# Patient Record
Sex: FEMALE | Race: WHITE | NOT HISPANIC OR LATINO | Employment: FULL TIME | URBAN - METROPOLITAN AREA
[De-identification: names, ages, dates, MRNs, and addresses within clinical notes are randomized per-mention and may not be internally consistent; named-entity substitution may affect disease eponyms.]

---

## 2017-04-10 DIAGNOSIS — R73.01 IMPAIRED FASTING GLUCOSE: ICD-10-CM

## 2017-04-12 LAB
ALBUMIN SERPL-MCNC: 4.2 G/DL (ref 3.5–5.5)
ALBUMIN/GLOB SERPL: 1.8 {RATIO} (ref 1.2–2.2)
ALP SERPL-CCNC: 67 IU/L (ref 39–117)
ALT SERPL-CCNC: 20 IU/L (ref 0–32)
AST SERPL-CCNC: 16 IU/L (ref 0–40)
BILIRUB SERPL-MCNC: 0.4 MG/DL (ref 0–1.2)
BUN SERPL-MCNC: 21 MG/DL (ref 6–24)
BUN/CREAT SERPL: 28 (ref 9–23)
CALCIUM SERPL-MCNC: 10 MG/DL (ref 8.7–10.2)
CHLORIDE SERPL-SCNC: 106 MMOL/L (ref 96–106)
CO2 SERPL-SCNC: 24 MMOL/L (ref 18–29)
CREAT SERPL-MCNC: 0.74 MG/DL (ref 0.57–1)
GLOBULIN SER CALC-MCNC: 2.3 G/DL (ref 1.5–4.5)
GLUCOSE SERPL-MCNC: 106 MG/DL (ref 65–99)
HBA1C MFR BLD: 5.5 % (ref 4.8–5.6)
POTASSIUM SERPL-SCNC: 4.4 MMOL/L (ref 3.5–5.2)
PROT SERPL-MCNC: 6.5 G/DL (ref 6–8.5)
SODIUM SERPL-SCNC: 146 MMOL/L (ref 134–144)

## 2017-04-14 ENCOUNTER — OFFICE VISIT (OUTPATIENT)
Dept: FAMILY MEDICINE CLINIC | Facility: CLINIC | Age: 52
End: 2017-04-14

## 2017-04-14 VITALS
SYSTOLIC BLOOD PRESSURE: 120 MMHG | BODY MASS INDEX: 24.4 KG/M2 | WEIGHT: 161 LBS | RESPIRATION RATE: 16 BRPM | OXYGEN SATURATION: 97 % | HEART RATE: 73 BPM | HEIGHT: 68 IN | DIASTOLIC BLOOD PRESSURE: 80 MMHG | TEMPERATURE: 97.5 F

## 2017-04-14 DIAGNOSIS — R73.01 IMPAIRED FASTING GLUCOSE: Primary | ICD-10-CM

## 2017-04-14 DIAGNOSIS — E78.2 MIXED HYPERLIPIDEMIA: ICD-10-CM

## 2017-04-14 DIAGNOSIS — Z00.00 LABORATORY EXAMINATION ORDERED AS PART OF A ROUTINE GENERAL MEDICAL EXAMINATION: ICD-10-CM

## 2017-04-14 PROCEDURE — 99213 OFFICE O/P EST LOW 20 MIN: CPT | Performed by: PHYSICIAN ASSISTANT

## 2017-04-14 NOTE — PATIENT INSTRUCTIONS
Low glycemic index diet  Exercise 30 minutes most days of the week  Make sure you get results on any labs or tests we ordered today  We discussed medications and how to take them as prescribed  Sleep 6-8 hours each night if possible  If you have not signed up for [employer], please activate your code ASAP from your After Visit Summary today    LDL goal <100  LDL goal if heart disease <70  HDL goal >60  Triglyceride goal <150  BP goal =<130/80  Fasting glucose <100

## 2017-04-14 NOTE — PROGRESS NOTES
Subjective   Eula Bajwa is a 51 y.o. female.     History of Present Illness   Eula Bajwa 51 y.o. female who presents today for routine follow up check and medication refills.  she has a history of There is no problem list on file for this patient.  .  Since the last visit, she has overall felt well.  She has hyperglycemia and stable..  she has been compliant with current medications have reviewed them.  The patient denies medication side effects.  2013 AHA (American Heart Association) Cholesterol Risk Ratio Score Goal is <=7.5% and your score is:  1.2%---OCT labs  Lab Results   Component Value Date    HGBA1C 5.5 04/10/2017     I did this d/t impaired fasting glucose;  a1C was normal    She is not taking REquip and doing fine;     Info for colonoscopy given and cologuard    I will update labs in Oct    Mom had borderline DMII    She does exercise and is working on diet;  Weight is down 13lbs at home  I am watching her lipids also  She sees GYN  The following portions of the patient's history were reviewed and updated as appropriate: allergies, current medications, past family history, past medical history, past social history, past surgical history and problem list.    Review of Systems   Constitutional: Negative for activity change, appetite change and unexpected weight change.   HENT: Negative for nosebleeds and trouble swallowing.    Eyes: Negative for pain and visual disturbance.   Respiratory: Negative for chest tightness, shortness of breath and wheezing.    Cardiovascular: Negative for chest pain and palpitations.   Gastrointestinal: Negative for abdominal pain and blood in stool.   Endocrine: Negative.    Genitourinary: Negative for difficulty urinating and hematuria.   Musculoskeletal: Negative for joint swelling.   Skin: Negative for color change and rash.   Allergic/Immunologic: Negative.    Neurological: Negative for syncope and speech difficulty.   Hematological: Negative for adenopathy.    Psychiatric/Behavioral: Negative for agitation and confusion.   All other systems reviewed and are negative.      Objective   Physical Exam   Constitutional: She is oriented to person, place, and time. She appears well-developed and well-nourished. No distress.   HENT:   Head: Normocephalic and atraumatic.   Eyes: Conjunctivae and EOM are normal. Pupils are equal, round, and reactive to light. Right eye exhibits no discharge. Left eye exhibits no discharge. No scleral icterus.   Neck: Normal range of motion. Neck supple. No tracheal deviation present. No thyromegaly present.   Cardiovascular: Normal rate, regular rhythm, normal heart sounds, intact distal pulses and normal pulses.  Exam reveals no gallop.    No murmur heard.  Pulmonary/Chest: Effort normal and breath sounds normal. No respiratory distress. She has no wheezes. She has no rales.   Musculoskeletal: Normal range of motion.   Neurological: She is alert and oriented to person, place, and time. She exhibits normal muscle tone. Coordination normal.   Skin: Skin is warm. No rash noted. No erythema. No pallor.   Psychiatric: She has a normal mood and affect. Her behavior is normal. Judgment and thought content normal.   Nursing note and vitals reviewed.      Assessment/Plan   Problems Addressed this Visit     None      Visit Diagnoses     Impaired fasting glucose    -  Primary    Mixed hyperlipidemia        Laboratory examination ordered as part of a routine general medical examination        Relevant Orders    Comprehensive metabolic panel    Lipid panel    CBC and Differential    TSH    Hemoglobin A1c    T4, Free    Vitamin D 25 Hydroxy

## 2017-07-25 ENCOUNTER — TRANSCRIBE ORDERS (OUTPATIENT)
Dept: OCCUPATIONAL THERAPY | Facility: CLINIC | Age: 52
End: 2017-07-25

## 2017-07-25 DIAGNOSIS — S46.911A RIGHT SHOULDER STRAIN, INITIAL ENCOUNTER: Primary | ICD-10-CM

## 2017-07-27 ENCOUNTER — TREATMENT (OUTPATIENT)
Dept: PHYSICAL THERAPY | Facility: CLINIC | Age: 52
End: 2017-07-27

## 2017-07-27 DIAGNOSIS — S46.911D SHOULDER STRAIN, RIGHT, SUBSEQUENT ENCOUNTER: Primary | ICD-10-CM

## 2017-07-27 PROCEDURE — 97140 MANUAL THERAPY 1/> REGIONS: CPT | Performed by: PHYSICAL THERAPIST

## 2017-07-27 PROCEDURE — 97033 APP MDLTY 1+IONTPHRSIS EA 15: CPT | Performed by: PHYSICAL THERAPIST

## 2017-07-27 PROCEDURE — 97001 PR PHYS THERAPY EVALUATION: CPT | Performed by: PHYSICAL THERAPIST

## 2017-07-27 PROCEDURE — 97035 APP MDLTY 1+ULTRASOUND EA 15: CPT | Performed by: PHYSICAL THERAPIST

## 2017-07-27 PROCEDURE — 97110 THERAPEUTIC EXERCISES: CPT | Performed by: PHYSICAL THERAPIST

## 2017-07-27 NOTE — PROGRESS NOTES
Physical Therapy Initial Evaluation and Plan of Care    Patient: Eula Bajwa   : 1965  Diagnosis/ICD-10 Code:  Shoulder strain, right, subsequent encounter [S46.911D]  Referring practitioner: Jose Light MD    Subjective Evaluation    History of Present Illness  Date of onset: 2017  Mechanism of injury: Repetitive closing of truck of car lid caused pinching type pain in the posterior shoulder - to BW in May - pred pack and ibu, HEP    Never fully resolved, now having some decrease in motion    Pred pack, multi V, iron pill  Previous C45 disc herniation, 3 epidurals - full resolution  Working around the house, staining the deck      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   Precautions and Work Restrictions: no lifting >10#, no overhead activityQuality of life: good    Pain  Current pain ratin  At best pain ratin  At worst pain ratin  Location: posterior/lateral shoulder, occasional sense of instability, occasional gets stuck feeling, tightness in UT, occasional tingling into the lateral  Quality: dull ache, sharp, radiating and tight  Relieving factors: medications and relaxation (stretches)  Aggravating factors: overhead activity, lifting and outstretched reach (arm behind back)  Progression: no change    Hand dominance: right             Objective     Postural Observations  Standing posture: fair    Additional Postural Observation Details  Slight protraction of the shoulders    Palpation     Right Tenderness of the infraspinatus and upper trapezius.     Tenderness     Right Shoulder  Tenderness in the infraspinatus tendon.     Additional Tenderness Details  Distal latissimus    Cervical/Thoracic Screen   Cervical range of motion within normal limits  Cervical range of motion within normal limits with the following exceptions: Slight restriction in left lateral flexion and left cervical rotatioin    Neurological Testing   Sensation     Shoulder     Right Shoulder   Intact: light  touch    Active Range of Motion     Right Shoulder   Flexion: 150 degrees   Abduction: 140 degrees   External rotation 90°: 85 degrees  Internal rotation 90°: 70 degrees     Passive Range of Motion     Right Shoulder   Flexion: 155 degrees   Abduction: 155 degrees   External rotation 90°: 90 degrees   Internal rotation 90°: 75 degrees     Scapular Mobility   Left Shoulder   Scapular Mobility with Shoulder to 90° FF   Scapular winging: moderate    Right Shoulder   Scapular mobility: WFL  Scapular Mobility with Shoulder to 90° FF   Scapular winging: minimal    Strength/Myotome Testing     Right Shoulder     Planes of Motion   Flexion: 4   Extension: 4+   Abduction: 4   External rotation at 0°: 4   External rotation at 45°: 4+     Tests     Right Shoulder   Positive active compression ([place]), crank, Hawkin's and Neer's.   Negative empty can.          Assessment & Plan     Assessment  Impairments: abnormal muscle firing, abnormal muscle tone, abnormal or restricted ROM, activity intolerance, impaired physical strength, lacks appropriate home exercise program and pain with function  Assessment details: 52 y.o. Female with right shoulder pain of 3 month duration presents with: 1. Constant posterior right shoulder pain, 2. Slightly decreased shoulder AROM, 3. Tenderness to deep palpation latissimus and posterior joint line, 4. Positive tests for probable labral involvement, 5. Sense of instability in the shoulder, 6. 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  Patient will have functional pain free shoulder AROM  Patient will be able to  perform simulated patient transfers without increased symptoms  Patient will return to work with min/no restrictions      Plan  Therapy options: will be seen for skilled physical therapy services  Planned modality interventions: ultrasound and iontophoresis  Planned therapy interventions: manual therapy, soft tissue mobilization, joint mobilization, home exercise program, strengthening and stretching  Frequency: 3x week  Duration in visits: 12  Duration in weeks: 4  Treatment plan discussed with: patient  Plan details: Discussed probable causes of symptoms and rehab process with patient  Functional Limitations: carrying objects, uncomfortable because of pain, reaching behind back and reaching overhead      Manual Therapy:    14     mins  82921;  Therapeutic Exercise:    10     mins  75326;     Neuromuscular James:    0    mins  01516;    Therapeutic Activity:     0     mins  42109;       Evaluation Time:     20  mins  Timed Treatment:   57   mins   Total Treatment:     90   mins    PT SIGNATURE: Cecelia Staley, PT   DATE TREATMENT INITIATED: 7/27/2017    Initial Certification  Certification Period: 10/25/2017  I certify that the therapy services are furnished while this patient is under my care.  The services outlined above are required by this patient, and will be reviewed every 90 days.     PHYSICIAN: Jose Light MD      DATE:     Please sign and return via fax to [phone].. Thank you, [facility].

## 2017-08-01 ENCOUNTER — TREATMENT (OUTPATIENT)
Dept: PHYSICAL THERAPY | Facility: CLINIC | Age: 52
End: 2017-08-01

## 2017-08-01 DIAGNOSIS — S46.911D SHOULDER STRAIN, RIGHT, SUBSEQUENT ENCOUNTER: Primary | ICD-10-CM

## 2017-08-01 PROCEDURE — 97035 APP MDLTY 1+ULTRASOUND EA 15: CPT | Performed by: PHYSICAL THERAPIST

## 2017-08-01 PROCEDURE — 97110 THERAPEUTIC EXERCISES: CPT | Performed by: PHYSICAL THERAPIST

## 2017-08-01 PROCEDURE — 97033 APP MDLTY 1+IONTPHRSIS EA 15: CPT | Performed by: PHYSICAL THERAPIST

## 2017-08-01 PROCEDURE — 97140 MANUAL THERAPY 1/> REGIONS: CPT | Performed by: PHYSICAL THERAPIST

## 2017-08-01 NOTE — PROGRESS NOTES
Physical Therapy Daily Progress Note    VISIT#: 2    Subjective   Eula Bajwa reports: that her shoulder is sore from the exercises but notes that it is muscle soreness not sharp pain. Having some popping and catching in the shoulder but states that      Objective   Positive Impingement with reaching across body    See Exercise, Manual, and Modality Logs for complete treatment.     Patient Education: Proper exercise technique    Assessment/Plan  Soreness from exercise but no new added true pain.    Progress per Plan of Care  To MD after next session         Manual Therapy:    16     mins  95045;  Therapeutic Exercise:    20     mins  75782;     Neuromuscular James:    0    mins  79501;    Therapeutic Activity:     0     mins  97127;       Timed Treatment:   59   mins   Total Treatment:     70   mins    Cecelia Staley, PT  [place] License # [id number]  Physical Therapist

## 2017-08-02 ENCOUNTER — TREATMENT (OUTPATIENT)
Dept: PHYSICAL THERAPY | Facility: CLINIC | Age: 52
End: 2017-08-02

## 2017-08-02 DIAGNOSIS — S46.911D SHOULDER STRAIN, RIGHT, SUBSEQUENT ENCOUNTER: Primary | ICD-10-CM

## 2017-08-02 PROCEDURE — 97110 THERAPEUTIC EXERCISES: CPT | Performed by: PHYSICAL THERAPIST

## 2017-08-02 PROCEDURE — 97530 THERAPEUTIC ACTIVITIES: CPT | Performed by: PHYSICAL THERAPIST

## 2017-08-02 PROCEDURE — 97140 MANUAL THERAPY 1/> REGIONS: CPT | Performed by: PHYSICAL THERAPIST

## 2017-08-02 NOTE — PROGRESS NOTES
MD Letter    Date of Initial Visit: 7/27/17  Today's Date: 8/2/2017  Patient seen for 3 sessions    Treatment has included: therapeutic exercise, manual therapy, ultrasound and iontophoresis    Subjective   States that she now has more fatigue in the shoulder from the exercises, but has less sharp pain in the shoulder.  States that the pain is deep in the posterior aspect of the shoulder.  Does report some popping/shifting in the joint, but not as bad as it had been. She does report improved mobility in the shoulder.    Objective   Shoulder AROM - flexion 166, Abduction 164, External rotation 90,  Internal rotation 85  Strength - flexion 4/5, abduction 4/5, external rotation 4+/5, internal rotation 5/5  Sensation - intact  Palpation - tenderness to deep palpation in the posterior joint  Special tests - positive OBriens test, positive Crank test, minimal pain with empty can test  Activity tolerances - she is able to perform simulated patinet transfers of supine to sit to stand without pain    Assessment/Plan  Patient has demonstrated moderate improvement since the initiation of therapy.  The pain has  Decreased in intensity and frequency, although she now has some muscle soreness from exercise.  The motion has increased .  The activity tolerances have increased.  I feel that the patient would benefit from a further short course of therapy.  If you have any questions concerning the care, please do not hesitate to contact me.        PT Signature: Cecelia Staley, PT        Manual Therapy:    16     mins  84430;  Therapeutic Exercise:    25     mins  46564;     Neuromuscular James:    0    mins  58585;    Therapeutic Activity:     10     mins  84144;   Assessed for MD    Timed Treatment:   51   mins   Total Treatment:     90   mins

## 2017-08-04 ENCOUNTER — TREATMENT (OUTPATIENT)
Dept: PHYSICAL THERAPY | Facility: CLINIC | Age: 52
End: 2017-08-04

## 2017-08-04 DIAGNOSIS — S46.911D SHOULDER STRAIN, RIGHT, SUBSEQUENT ENCOUNTER: Primary | ICD-10-CM

## 2017-08-04 PROCEDURE — 97140 MANUAL THERAPY 1/> REGIONS: CPT | Performed by: PHYSICAL THERAPIST

## 2017-08-04 PROCEDURE — 97033 APP MDLTY 1+IONTPHRSIS EA 15: CPT | Performed by: PHYSICAL THERAPIST

## 2017-08-04 PROCEDURE — 97110 THERAPEUTIC EXERCISES: CPT | Performed by: PHYSICAL THERAPIST

## 2017-08-04 NOTE — PROGRESS NOTES
Physical Therapy Daily Progress Note    VISIT#: 4    Subjective   Eula Bajwa reports: that she feels much better today than she had been feeling.  States that the posterior shoulder pain seems to have resolved, but has some muscle soreness from hte exercise.      Objective   Flexion 165 after stretch    See Exercise, Manual, and Modality Logs for complete treatment.     Patient Education: cont HEP    Assessment/Plan  Doing extremely well.  Has near full motion and no joint pain today.      Progress strengthening /stabilization /functional activity           Manual Therapy:    17     mins  47254;  Therapeutic Exercise:    25     mins  93902;     Neuromuscular James:    0    mins  03661;    Therapeutic Activity:     0     mins  01294;       Timed Treatment:   57   mins   Total Treatment:     65   mins    Cecelia Staley, PT  [place] License # [id number]  Physical Therapist

## 2017-08-07 ENCOUNTER — TREATMENT (OUTPATIENT)
Dept: PHYSICAL THERAPY | Facility: CLINIC | Age: 52
End: 2017-08-07

## 2017-08-07 DIAGNOSIS — S46.911D SHOULDER STRAIN, RIGHT, SUBSEQUENT ENCOUNTER: Primary | ICD-10-CM

## 2017-08-07 PROCEDURE — 97140 MANUAL THERAPY 1/> REGIONS: CPT | Performed by: PHYSICAL THERAPIST

## 2017-08-07 PROCEDURE — 97110 THERAPEUTIC EXERCISES: CPT | Performed by: PHYSICAL THERAPIST

## 2017-08-07 PROCEDURE — 97033 APP MDLTY 1+IONTPHRSIS EA 15: CPT | Performed by: PHYSICAL THERAPIST

## 2017-08-07 NOTE — PROGRESS NOTES
Physical Therapy Daily Progress Note    VISIT#: 5    Subjective   Eula Bajwa reports: that she is feeling much better than she did last week.  States that she has an occasional pressure in the back of the shoulder but notes that the pain has decreased in both intensity and frequency.      Objective    Active flexion to 165 without pain    Discomfort with deep pressure to the posterior joint line area    Negative Empty Can test    See Exercise, Manual, and Modality Logs for complete treatment.     Patient Education: reason for soreness in lats after exercise    Assessment/Plan  Much improved from initial status.  Decreased pain and increased function.     Progress strengthening /stabilization /functional activity  To MD after next session         Manual Therapy:    17     mins  76905;  Therapeutic Exercise:    25     mins  23571;     Neuromuscular James:    0    mins  97322;    Therapeutic Activity:     0     mins  54979;       Timed Treatment:   57   mins   Total Treatment:     75   mins    Cecelia Staley, PT  [place] License # [id number]  Physical Therapist

## 2017-08-08 ENCOUNTER — TREATMENT (OUTPATIENT)
Dept: PHYSICAL THERAPY | Facility: CLINIC | Age: 52
End: 2017-08-08

## 2017-08-08 PROCEDURE — 97033 APP MDLTY 1+IONTPHRSIS EA 15: CPT | Performed by: PHYSICAL THERAPIST

## 2017-08-08 PROCEDURE — 97140 MANUAL THERAPY 1/> REGIONS: CPT | Performed by: PHYSICAL THERAPIST

## 2017-08-08 PROCEDURE — 97110 THERAPEUTIC EXERCISES: CPT | Performed by: PHYSICAL THERAPIST

## 2017-08-08 PROCEDURE — 97530 THERAPEUTIC ACTIVITIES: CPT | Performed by: PHYSICAL THERAPIST

## 2017-08-08 NOTE — PROGRESS NOTES
MD Letter - Discharge Summary    Date of Initial Visit: 7/27/17  Today's Date: 8/8/2017  Patient seen for 6 sessions    Treatment has included: therapeutic exercise, neuromuscular re-education, manual therapy, ultrasound and iontophoresis    Subjective   States that she feels much better than she did last week.  States that she has a posterior joint ache with occasional movements but no constant pain.  She reports an occasional pop in the posterior joint with overhead activities.      Objective - she presents moving with fluid movement patterns  Shoulder AROM - flexion 166, abduction 158, external rotation 90, internal rotation 85  Strength - flexion 4+/5, abduction 4+/5, extension 5/5, external rotation 4/5, internal rotation 5/5  Sensation - intact  Palpation - tenderness to deep palpation in the posterior inferior joint line, slight tenderness to palpation anterior/lateral subacromial space  Special tests - negative speeds, slightly positive Empty can, positive Load and shift but negative O'Briens test for Labral involvement   Activity tolerances - she is able to perform simulated patinet transfers supine to sit to stand without pain.  She is able to perform the simulated trunk lid closure without pain.      Assessment/Plan  Patient has demonstrated significant improvement since the initiation of therapy.  The pain has  decreased in both frequency and intensity.  The motion has increased to a functional level.  The activity tolerances have increased t work demand level, but she still has occasional symptoms in the shoulder.  I feel that the patient would benefit from continuing her home exercises for further strengthening, but discontinuing her formal therapy. If her symptoms reoccur, I feel that she may benefit from an orthopedic referral.  If you have any questions concerning the care, please do not hesitate to contact me.        PT Signature: Cecelia Staley, PT        Manual Therapy:    17     mins   93171;  Therapeutic Exercise:    15/25     mins  72521;     Neuromuscular James:    0    mins  00827;    Therapeutic Activity:     10     mins  42431;       Timed Treatment:   57   mins   Total Treatment:     90   mins

## 2017-08-14 ENCOUNTER — TRANSCRIBE ORDERS (OUTPATIENT)
Dept: ADMINISTRATIVE | Facility: HOSPITAL | Age: 52
End: 2017-08-14

## 2017-08-14 DIAGNOSIS — M25.511 RIGHT SHOULDER PAIN, UNSPECIFIED CHRONICITY: Primary | ICD-10-CM

## 2017-08-16 ENCOUNTER — TRANSCRIBE ORDERS (OUTPATIENT)
Dept: ADMINISTRATIVE | Facility: HOSPITAL | Age: 52
End: 2017-08-16

## 2017-08-16 DIAGNOSIS — G89.29 CHRONIC RIGHT SHOULDER PAIN: Primary | ICD-10-CM

## 2017-08-16 DIAGNOSIS — M25.511 CHRONIC RIGHT SHOULDER PAIN: Primary | ICD-10-CM

## 2017-08-22 ENCOUNTER — HOSPITAL ENCOUNTER (OUTPATIENT)
Dept: MRI IMAGING | Facility: HOSPITAL | Age: 52
Discharge: HOME | End: 2017-08-22
Attending: ORTHOPAEDIC SURGERY

## 2017-08-22 DIAGNOSIS — M25.511 CHRONIC RIGHT SHOULDER PAIN: ICD-10-CM

## 2017-08-22 DIAGNOSIS — G89.29 CHRONIC RIGHT SHOULDER PAIN: ICD-10-CM

## 2017-08-22 PROCEDURE — 73221 MRI JOINT UPR EXTREM W/O DYE: CPT

## 2017-08-29 ENCOUNTER — TRANSCRIBE ORDERS (OUTPATIENT)
Dept: PHYSICAL THERAPY | Facility: CLINIC | Age: 52
End: 2017-08-29

## 2017-08-29 ENCOUNTER — TREATMENT (OUTPATIENT)
Dept: PHYSICAL THERAPY | Facility: CLINIC | Age: 52
End: 2017-08-29

## 2017-08-29 DIAGNOSIS — S46.911D SHOULDER STRAIN, RIGHT, SUBSEQUENT ENCOUNTER: ICD-10-CM

## 2017-08-29 DIAGNOSIS — M19.011 OSTEOARTHRITIS OF RIGHT SHOULDER, UNSPECIFIED OSTEOARTHRITIS TYPE: Primary | ICD-10-CM

## 2017-08-29 DIAGNOSIS — M24.111 LABRAL TEAR OF SHOULDER, DEGENERATIVE, RIGHT: Primary | ICD-10-CM

## 2017-08-29 PROCEDURE — 97002 PR PHYS THERAPY RE-EVALUATION: CPT | Performed by: PHYSICAL THERAPIST

## 2017-08-29 PROCEDURE — 97110 THERAPEUTIC EXERCISES: CPT | Performed by: PHYSICAL THERAPIST

## 2017-08-29 PROCEDURE — 97140 MANUAL THERAPY 1/> REGIONS: CPT | Performed by: PHYSICAL THERAPIST

## 2017-08-29 NOTE — PROGRESS NOTES
Physical Therapy Initial Evaluation and Plan of Care    Patient: Eula Bajwa   : 1965  Diagnosis/ICD-10 Code:  Labral tear of shoulder, degenerative, right [M24.111]  Referring practitioner: Perico Salomon MD    Subjective Evaluation    History of Present Illness    Subjective comment: Say Dr Salomon, MRI indicates degenerative labral tear with loose chondral bodies, steroid injection 17, has continued to do her HEP daily, has started her on MobicPain  Current pain ratin  At best pain ratin  At worst pain ratin  Location: Lateral shoulder, minimal in posterior shoulder where it was since injury, clicking with elevation  Quality: burning and dull ache  Relieving factors: change in position  Aggravating factors: repetitive movement, outstretched reach and overhead activity  Progression: improved    Diagnostic Tests  MRI studies: abnormal (degenerative labral tear, chondral bodies loose)    Treatments  Previous treatment: physical therapy and medication  Current treatment: injection treatment, medication and physical therapy  Patient Goals  Patient goals for therapy: independence with ADLs/IADLs and return to sport/leisure activities       Objective     Observations     Additional Observation Details  Slightly rounded posture    Palpation     Right   No palpable tenderness to the biceps and infraspinatus.   Hypertonic in the upper trapezius. Tenderness of the supraspinatus.     Active Range of Motion     Right Shoulder   Flexion: 156 degrees   Abduction: 140 degrees   External rotation 90°: 90 degrees  Internal rotation 90°: 80 degrees     Scapular Mobility     Right Shoulder   Scapular mobility: WFL    Joint Play     Right Shoulder  Hypomobile in the posterior capsule.     Strength/Myotome Testing     Right Shoulder     Planes of Motion   Flexion: 4   Extension: 5   Abduction: 4   External rotation at 0°: 4   Internal rotation at 0°: 4+     Isolated Muscles   Biceps: 4+   Supraspinatus: 4    Triceps: 4+     Tests     Right Shoulder   Positive active compression ([place]) and Speed's.   Negative empty can.     Additional Tests Details  Moderate Pain and weakness with O'Briens and Speeds tests but no joint motion noted         Assessment & Plan     Assessment  Impairments: abnormal or restricted ROM, activity intolerance, impaired physical strength, lacks appropriate home exercise program and pain with function  Assessment details: 89 y.o. Female with right shoulder pain and labral tear presents with: 1. Intermittent right shoulder pain, 2. Slightly decreased shoulder AROM, 3. Weakness in right shoulder mm, 4. Recent steroid injection, 5. 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  Patient will have functional pain free shoulder AROM  Patient will be able to perform simulated patient transfers without increased symptoms  Patient will return to work with min/no restrictions        Plan  Therapy options: will be seen for skilled physical therapy services  Planned therapy interventions: manual therapy, strengthening, stretching, joint mobilization and home exercise program  Frequency: 3x week  Duration in visits: 12  Duration in weeks: 4  Treatment plan discussed with: patient        Manual Therapy:    17     mins  79143;  Therapeutic Exercise:    25     mins  72014;     Neuromuscular James:    0    mins  41140;    Therapeutic Activity:     0     mins  20889;       Evaluation Time:      20  mins  Timed Treatment:   42   mins   Total Treatment:     80   mins    PT SIGNATURE: Cecelia Staley, PT   DATE TREATMENT INITIATED: 8/29/2017    Initial Certification  Certification Period: 11/27/2017  I certify that the therapy services are furnished while this patient is under my care.  The services outlined above are required by this patient, and will be reviewed every 90 days.     PHYSICIAN:  Perico Salomon _______________________     DATE:_______________________     Please sign and return via fax to [phone].. Thank you, [facility].

## 2017-08-31 ENCOUNTER — TREATMENT (OUTPATIENT)
Dept: PHYSICAL THERAPY | Facility: CLINIC | Age: 52
End: 2017-08-31

## 2017-08-31 DIAGNOSIS — M24.111 LABRAL TEAR OF SHOULDER, DEGENERATIVE, RIGHT: Primary | ICD-10-CM

## 2017-08-31 PROCEDURE — 97140 MANUAL THERAPY 1/> REGIONS: CPT | Performed by: PHYSICAL THERAPIST

## 2017-08-31 PROCEDURE — 97110 THERAPEUTIC EXERCISES: CPT | Performed by: PHYSICAL THERAPIST

## 2017-09-01 ENCOUNTER — OFFICE VISIT (OUTPATIENT)
Dept: FAMILY MEDICINE CLINIC | Facility: CLINIC | Age: 52
End: 2017-09-01

## 2017-09-01 VITALS
SYSTOLIC BLOOD PRESSURE: 119 MMHG | HEIGHT: 68 IN | TEMPERATURE: 98 F | HEART RATE: 81 BPM | RESPIRATION RATE: 18 BRPM | BODY MASS INDEX: 25.23 KG/M2 | WEIGHT: 166.5 LBS | OXYGEN SATURATION: 97 % | DIASTOLIC BLOOD PRESSURE: 79 MMHG

## 2017-09-01 DIAGNOSIS — J06.9 ACUTE URI: Primary | ICD-10-CM

## 2017-09-01 PROCEDURE — 99213 OFFICE O/P EST LOW 20 MIN: CPT | Performed by: NURSE PRACTITIONER

## 2017-09-01 RX ORDER — AMOXICILLIN AND CLAVULANATE POTASSIUM 875; 125 MG/1; MG/1
1 TABLET, FILM COATED ORAL 2 TIMES DAILY
Qty: 20 TABLET | Refills: 0 | Status: SHIPPED | OUTPATIENT
Start: 2017-09-01 | End: 2017-09-11

## 2017-09-01 NOTE — PROGRESS NOTES
Subjective   Eula Bajwa is a 52 y.o. female.     History of Present Illness   Eula Bajwa 52 y.o. female who presents for evaluation of sinus pressure and congestion. Symptoms include ear pressure, congestion, sore throat, post nasal drip and productive cough.  Onset of symptoms was 7 days ago, unchanged since that time. Patient denies shortness of breath, wheezing.   Evaluation to date: none Treatment to date:  OTC oral decongestants, OTC antihistamines and Mucinex.     The following portions of the patient's history were reviewed and updated as appropriate: allergies, current medications, past family history, past medical history, past social history, past surgical history and problem list.    Review of Systems   Constitutional: Negative for chills and fever.   HENT: Positive for congestion, ear pain, postnasal drip, rhinorrhea and sinus pressure.    Respiratory: Positive for cough. Negative for chest tightness.        Objective   Physical Exam   Constitutional: She is oriented to person, place, and time. She appears well-developed and well-nourished.   HENT:   Right Ear: Tympanic membrane, external ear and ear canal normal.   Left Ear: Tympanic membrane, external ear and ear canal normal.   Nose: Right sinus exhibits no maxillary sinus tenderness and no frontal sinus tenderness. Left sinus exhibits no maxillary sinus tenderness and no frontal sinus tenderness.   Mouth/Throat: Uvula is midline and oropharynx is clear and moist.   Cardiovascular: Normal rate and regular rhythm.    Pulmonary/Chest: Effort normal and breath sounds normal.   Neurological: She is alert and oriented to person, place, and time.   Skin: Skin is warm and dry.   Psychiatric: She has a normal mood and affect. Judgment normal.   Nursing note and vitals reviewed.      Assessment/Plan   Eula was seen today for sinusitis.    Diagnoses and all orders for this visit:    Acute URI  -     amoxicillin-clavulanate (AUGMENTIN) 875-125 MG  per tablet; Take 1 tablet by mouth 2 (Two) Times a Day for 10 days.

## 2017-09-05 ENCOUNTER — TREATMENT (OUTPATIENT)
Dept: PHYSICAL THERAPY | Facility: CLINIC | Age: 52
End: 2017-09-05

## 2017-09-05 DIAGNOSIS — M24.111 LABRAL TEAR OF SHOULDER, DEGENERATIVE, RIGHT: Primary | ICD-10-CM

## 2017-09-05 PROCEDURE — 97140 MANUAL THERAPY 1/> REGIONS: CPT | Performed by: PHYSICAL THERAPIST

## 2017-09-05 PROCEDURE — 97110 THERAPEUTIC EXERCISES: CPT | Performed by: PHYSICAL THERAPIST

## 2017-09-05 NOTE — PROGRESS NOTES
Physical Therapy Daily Progress Note    VISIT#: 3    Subjective   Eula Bajwa reports: that her shoulder is feeling much better.  States that she has little pain and decreased frequency of popping in the shoulder.      Objective   Pain at end range flexion and abduction    Symmetrical movement into flexion    See Exercise, Manual, and Modality Logs for complete treatment.     Patient Education:    Assessment/Plan  Improved scapular stabilization noted as she does have winging with wall push ups any longer. Pain has decreased and motion has increased since the initiation of therapy.    Progress strengthening /stabilization /functional activity           Manual Therapy:    18     mins  11228;  Therapeutic Exercise:    25/50     mins  96506;     Neuromuscular James:    0    mins  68225;    Therapeutic Activity:     0     mins  23154;       Timed Treatment:   43   mins   Total Treatment:     70   mins    Cecelia Staley, PT  [place] License # [id number]  Physical Therapist

## 2017-09-08 ENCOUNTER — TREATMENT (OUTPATIENT)
Dept: PHYSICAL THERAPY | Facility: CLINIC | Age: 52
End: 2017-09-08

## 2017-09-08 DIAGNOSIS — M24.111 LABRAL TEAR OF SHOULDER, DEGENERATIVE, RIGHT: Primary | ICD-10-CM

## 2017-09-08 PROCEDURE — 97140 MANUAL THERAPY 1/> REGIONS: CPT | Performed by: PHYSICAL THERAPIST

## 2017-09-08 PROCEDURE — 97110 THERAPEUTIC EXERCISES: CPT | Performed by: PHYSICAL THERAPIST

## 2017-09-08 NOTE — PROGRESS NOTES
Physical Therapy Daily Progress Note    VISIT#: 4    Subjective   Eula Bajwa reports: that  She has had only little pain in the past few days. States that she still has some tightness but that is resolving as well with the stretches.       Objective   Flexion 170* with stretch    Pinch in anterior shoulder noted with physioball walk out today    See Exercise, Manual, and Modality Logs for complete treatment.     Patient Education: purpose of iontopatch    Assessment/Plan  Continuing to make improvements in strength and has had little to no pain while working this week.  Still with slight impingement in anterior shoulder with UE WB activity.    Progress strengthening /stabilization /functional activity           Manual Therapy:    15     mins  40783;  Therapeutic Exercise:    40     mins  94574;     Neuromuscular James:    0    mins  91793;    Therapeutic Activity:     0     mins  75163;       Timed Treatment:   63   mins   Total Treatment:     70   mins    Cecelia Staley, PT  [place] License # [id number]  Physical Therapist

## 2017-09-12 ENCOUNTER — TREATMENT (OUTPATIENT)
Dept: PHYSICAL THERAPY | Facility: CLINIC | Age: 52
End: 2017-09-12

## 2017-09-12 DIAGNOSIS — M24.111 LABRAL TEAR OF SHOULDER, DEGENERATIVE, RIGHT: Primary | ICD-10-CM

## 2017-09-12 PROCEDURE — 97140 MANUAL THERAPY 1/> REGIONS: CPT | Performed by: PHYSICAL THERAPIST

## 2017-09-12 PROCEDURE — 97110 THERAPEUTIC EXERCISES: CPT | Performed by: PHYSICAL THERAPIST

## 2017-09-12 NOTE — PROGRESS NOTES
Physical Therapy Daily Progress Note    VISIT#: 5    Subjective   Eula Bajwa reports: that she is having  A little more popping in the shoulder than she had been having.  Denies pain with the popping.       Objective   Pinch at end range flexion and abduction     Occasional palpable pops/click with elevation    See Exercise, Manual, and Modality Logs for complete treatment.     Patient Education:    Assessment/Plan  Strength has increased in the shoulder and scapular muscles as she is able to increase resistance with many activities.     Anticipate DC next Visit           Manual Therapy:    17     mins  69014;  Therapeutic Exercise:    30/45     mins  91906;     Neuromuscular James:    0    mins  51064;    Therapeutic Activity:     0     mins  33358;       Timed Treatment:   47   mins   Total Treatment:     70   mins    Cecelia Staley, PT  [place] License # [id number]  Physical Therapist

## 2017-09-14 ENCOUNTER — TREATMENT (OUTPATIENT)
Dept: PHYSICAL THERAPY | Facility: CLINIC | Age: 52
End: 2017-09-14

## 2017-09-14 DIAGNOSIS — M24.111 LABRAL TEAR OF SHOULDER, DEGENERATIVE, RIGHT: Primary | ICD-10-CM

## 2017-09-14 PROCEDURE — 97530 THERAPEUTIC ACTIVITIES: CPT | Performed by: PHYSICAL THERAPIST

## 2017-09-14 PROCEDURE — 97140 MANUAL THERAPY 1/> REGIONS: CPT | Performed by: PHYSICAL THERAPIST

## 2017-09-14 PROCEDURE — 97110 THERAPEUTIC EXERCISES: CPT | Performed by: PHYSICAL THERAPIST

## 2017-09-14 NOTE — PROGRESS NOTES
MD Letter - Discharge Summary    Date of Initial Visit: Type: THERAPY  Noted: 8/29/2017  Today's Date: 9/14/2017  Patient seen for 6 sessions    Treatment has included: therapeutic exercise, neuromuscular re-education, manual therapy and therapeutic activity    Subjective   States that her shoulder is feeling much better now than it was prior to the injection and additional therapy.  She still has occasional clicking with abduction, but it is not painful and does not have a sense of instability or shifting with the click.  States that she is now able to perform all of her regular home and work activities without pain.    Objective   Shoulder AROM - WNL - equal to the uninvolved shoulder  Strength - Flexion 5/5, Abduction 5/5,  Extension 5/5,  ER 4+/5, IR 5/5  Sensation - intact  Palpation - minimal tenderness to palpation in the biceps tendon  Special tests - Pian and weakness with Linn's test, but no pop or shift noted, negative Empty Can and speeds tests  Activity tolerances - she is able to perform simulated patient transfers and all normal activities without iincreased symptoms    Assessment/Plan  Patient has demonstrated significant improvement since the initiation of therapy.  The pain has  Essentially resolved.  The motion has returned to normal.  The activity tolerances have increased to work demand level.  I feel that the patient would benefit from continuing her home exercise program but discontinuing her formal therapy.  If you have any questions concerning the care, please do not hesitate to contact me.          PT Signature: Cecelia Staley, PT        Manual Therapy:    16     mins  37245;  Therapeutic Exercise:    35     mins  77167;     Neuromuscular James:    0    mins  93377;    Therapeutic Activity:     10     mins  26850;   Assessed for MD    Timed Treatment:   61   mins   Total Treatment:     70   mins

## 2017-10-02 ENCOUNTER — RESULTS ENCOUNTER (OUTPATIENT)
Dept: FAMILY MEDICINE CLINIC | Facility: CLINIC | Age: 52
End: 2017-10-02

## 2017-10-02 DIAGNOSIS — Z00.00 LABORATORY EXAMINATION ORDERED AS PART OF A ROUTINE GENERAL MEDICAL EXAMINATION: ICD-10-CM

## 2018-10-19 ENCOUNTER — OFFICE VISIT (OUTPATIENT)
Dept: ORTHOPEDIC SURGERY | Facility: CLINIC | Age: 53
End: 2018-10-19

## 2018-10-19 VITALS — HEIGHT: 68 IN | BODY MASS INDEX: 26.8 KG/M2 | TEMPERATURE: 99.3 F | WEIGHT: 176.8 LBS

## 2018-10-19 DIAGNOSIS — G89.29 CHRONIC PAIN OF RIGHT KNEE: Primary | ICD-10-CM

## 2018-10-19 DIAGNOSIS — M25.561 CHRONIC PAIN OF RIGHT KNEE: Primary | ICD-10-CM

## 2018-10-19 DIAGNOSIS — M17.11 PRIMARY OSTEOARTHRITIS OF RIGHT KNEE: ICD-10-CM

## 2018-10-19 PROCEDURE — 99204 OFFICE O/P NEW MOD 45 MIN: CPT | Performed by: ORTHOPAEDIC SURGERY

## 2018-10-19 PROCEDURE — 73562 X-RAY EXAM OF KNEE 3: CPT | Performed by: ORTHOPAEDIC SURGERY

## 2018-10-19 RX ORDER — GUAIFENESIN, PSEUDOEPHEDRINE HYDROCHLORIDE 600; 60 MG/1; MG/1
1 TABLET, EXTENDED RELEASE ORAL AS NEEDED
COMMUNITY

## 2018-10-19 RX ORDER — IBUPROFEN 200 MG
400 TABLET ORAL EVERY 6 HOURS PRN
COMMUNITY
End: 2019-02-08 | Stop reason: HOSPADM

## 2018-10-19 RX ORDER — MELOXICAM 15 MG/1
15 TABLET ORAL DAILY
COMMUNITY
End: 2021-02-17

## 2018-10-19 NOTE — PROGRESS NOTES
Patient: Eula Navarrete  YOB: 1965 53 y.o. female  Medical Record Number: [id number]    Chief Complaints:   Chief Complaint   Patient presents with   • Right Knee - Establish Care, Pain       History of Present Illness:Eula Navarrete is a 53 y.o. female who presents with complaints of right medial knee pain that has been ongoing for years.  She saw Dr. Amezcua about 4 years ago had a right knee scope and medial meniscectomy.  She was told at that time that she had bone-on-bone medial compartment arthritis.  Unfortunately her pain is worsened.  She now has feelings of instability as though the knee was to give out on her.  On uneven ground or stairs she's very apprehensive that she feels the knee could give out in the second.  She's had previous injections and done physical therapy.  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    Allergies: No Known Allergies    Medications:   Current Outpatient Prescriptions   Medication Sig Dispense Refill   • acetaminophen (TYLENOL) 325 MG tablet Take  by mouth.     • Estradiol 0.75 MG/1.25 GM (0.06%) topical gel Place 1 application on the skin.     • ibuprofen (ADVIL,MOTRIN) 200 MG tablet Take 400 mg by mouth As Needed for Mild Pain .     • Iron-Vit C-Vit B12-Folic Acid (IRON 100 PLUS PO) Take  by mouth.     • meloxicam (MOBIC) 15 MG tablet Take 15 mg by mouth Daily.     • MULTIPLE VITAMIN PO Take  by mouth.     • pseudoephedrine (SUDAFED 12 HOUR) 120 MG 12 hr tablet Take 120 mg by mouth Every 12 (Twelve) Hours.     • pseudoephedrine-guaifenesin (MUCINEX D)  MG per 12 hr tablet Take 1 tablet by mouth As Needed for Allergies.       No current facility-administered medications for this visit.          The following portions of the patient's history were reviewed and updated as appropriate: allergies, current medications, past family history, past medical history, past social history, past surgical history and problem list.    Review of Systems:   A 14 point  "review of systems was performed. All systems negative except pertinent positives/negative listed in HPI above    Physical Exam:   Vitals:    10/19/18 1618   Temp: 99.3 °F (37.4 °C)   TempSrc: Temporal Artery    Weight: 80.2 kg (176 lb 12.8 oz)   Height: 172.7 cm (68\")       General: A and O x 3, ASA, NAD    SCLERA:    Normal    DENTITION:   Normal  Knee:  right    ALIGNMENT:     Varus  ,   Patella  tracks  midline out crepitance    GAIT:    Antalgic    SKIN:    No abnormality    RANGE OF MOTION:   0  -  120   DEG    STRENGTH:   4  / 5    LIGAMENTS:    No varus / valgus instability.   Negative  Lachman.    MENISCUS:     Negative   Lucina       DISTAL PULSES:    Paplable    DISTAL SENSATION :   Intact    LYMPHATICS:     No   lymphadenopathy    OTHER:          - Positive   effusion      -Medial Crepitance with ROM          Radiology:  Xrays 3views right knee (ap,lateral, sunrise) were ordered and reviewed for evaluation of knee pain demonstrating advanced varus osteoarthritis with bone on bone articulation, subchondral cysts, and periarticular osteophytes.  Wear is isolated to the medial compartment.  There are no previous films for comparison.    Assessment/Plan: Right knee isolated medial compartment end-stage advanced Auster arthritis with bone-on-bone articulation.  She has failed the full complement of conservative measures.  Continuation of conservative management vs. UKA vs TKA discussed. After stating understanding of the procedures and associated risks / benefit / alternatives of the various options,  the patient wishes to proceed with unicompartmental knee replacement.  At this point the patient has failed the full gamut of conservative treatment and stating complete understanding of the risks / benefits / anternatives wishes to proceed with surgical treatment.    The patient understands that no guarantees have been made with regard to outcomes of this procedure and that there is a possibility that future " surgery is a possibility including conversion to total knee replacement should further disease progression occur in other compartments of the knee.    Risk and benefits of surgery were reviewed.  Including, but not limited to, blood clots or pulmonary embolism, anesthesia risk, infection, fracture, skin/leg numbness, persistent pain/crepitance/popping/catching, failure of the implant, need for future surgeries, hematoma, possible nerve or blood vessel injury, need for transfusion, and potential risk of stroke,heart attack or death, among others.  The patient understands and wishes to proceed.     It was explained that if tissue has been repaired or reconstructed, there is also an increased chance of failure which may require further management.  Following the completion of the discussion, the patient expressed understanding of this planned course of care, all their questions were answered and consent will be obtained preoperatively.    Operative Plan:  Right Salomon and Nephew/ Jay unicompartmental knee replacement performing the procedure on an outpatient basiswith outpatient rehab -  she has had problems with anesthesia in the past so we will plan on spinal anesthesia with music therapy.  She will call when she is ready to schedule.        Ronnie Nguyen MD  10/19/2018

## 2018-12-04 ENCOUNTER — TELEPHONE (OUTPATIENT)
Dept: ORTHOPEDIC SURGERY | Facility: CLINIC | Age: 53
End: 2018-12-04

## 2018-12-04 NOTE — TELEPHONE ENCOUNTER
Regarding: Visit Follow-Up Question  Contact: [phone]  ----- Message from Mychart, Generic sent at 12/3/2018  5:32 PM EST -----  MY CHART MESSAGE:  I called the office to schedule my knee surgery for sometime in January on Monday Nov 26 and no one has returned my call. Is there anything I need to do to have someone call me and assist with getting this scheduled?

## 2018-12-07 DIAGNOSIS — M17.11 PRIMARY OSTEOARTHRITIS OF RIGHT KNEE: Primary | ICD-10-CM

## 2018-12-07 RX ORDER — MELOXICAM 15 MG/1
15 TABLET ORAL ONCE
Status: CANCELLED | OUTPATIENT
Start: 2019-02-08 | End: 2018-12-07

## 2018-12-07 RX ORDER — PREGABALIN 75 MG/1
150 CAPSULE ORAL ONCE
Status: CANCELLED | OUTPATIENT
Start: 2019-02-08 | End: 2018-12-07

## 2018-12-07 RX ORDER — CEFAZOLIN SODIUM 2 G/100ML
2 INJECTION, SOLUTION INTRAVENOUS ONCE
Status: CANCELLED | OUTPATIENT
Start: 2019-02-08 | End: 2018-12-07

## 2018-12-11 PROBLEM — M17.11 PRIMARY OSTEOARTHRITIS OF RIGHT KNEE: Status: ACTIVE | Noted: 2018-12-11

## 2019-01-25 ENCOUNTER — APPOINTMENT (OUTPATIENT)
Dept: PREADMISSION TESTING | Facility: HOSPITAL | Age: 54
End: 2019-01-25

## 2019-01-25 VITALS
SYSTOLIC BLOOD PRESSURE: 125 MMHG | TEMPERATURE: 97.9 F | HEIGHT: 68 IN | BODY MASS INDEX: 26.98 KG/M2 | HEART RATE: 103 BPM | RESPIRATION RATE: 18 BRPM | DIASTOLIC BLOOD PRESSURE: 83 MMHG | OXYGEN SATURATION: 98 % | WEIGHT: 178 LBS

## 2019-01-25 DIAGNOSIS — M17.11 PRIMARY OSTEOARTHRITIS OF RIGHT KNEE: ICD-10-CM

## 2019-01-25 LAB
ANION GAP SERPL CALCULATED.3IONS-SCNC: 10.5 MMOL/L
BILIRUB UR QL STRIP: NEGATIVE
BUN BLD-MCNC: 15 MG/DL (ref 6–20)
BUN/CREAT SERPL: 18.5 (ref 7–25)
CALCIUM SPEC-SCNC: 9.4 MG/DL (ref 8.6–10.5)
CHLORIDE SERPL-SCNC: 104 MMOL/L (ref 98–107)
CLARITY UR: ABNORMAL
CO2 SERPL-SCNC: 28.5 MMOL/L (ref 22–29)
COLOR UR: YELLOW
CREAT BLD-MCNC: 0.81 MG/DL (ref 0.57–1)
DEPRECATED RDW RBC AUTO: 44.5 FL (ref 37–54)
ERYTHROCYTE [DISTWIDTH] IN BLOOD BY AUTOMATED COUNT: 12.8 % (ref 11.7–13)
GFR SERPL CREATININE-BSD FRML MDRD: 74 ML/MIN/1.73
GLUCOSE BLD-MCNC: 99 MG/DL (ref 65–99)
GLUCOSE UR STRIP-MCNC: NEGATIVE MG/DL
HCT VFR BLD AUTO: 45.3 % (ref 35.6–45.5)
HGB BLD-MCNC: 15 G/DL (ref 11.9–15.5)
HGB UR QL STRIP.AUTO: NEGATIVE
KETONES UR QL STRIP: ABNORMAL
LEUKOCYTE ESTERASE UR QL STRIP.AUTO: NEGATIVE
MCH RBC QN AUTO: 31.8 PG (ref 26.9–32)
MCHC RBC AUTO-ENTMCNC: 33.1 G/DL (ref 32.4–36.3)
MCV RBC AUTO: 96 FL (ref 80.5–98.2)
NITRITE UR QL STRIP: NEGATIVE
PH UR STRIP.AUTO: <=5 [PH] (ref 5–8)
PLATELET # BLD AUTO: 279 10*3/MM3 (ref 140–500)
PMV BLD AUTO: 11.7 FL (ref 6–12)
POTASSIUM BLD-SCNC: 4 MMOL/L (ref 3.5–5.2)
PROT UR QL STRIP: NEGATIVE
RBC # BLD AUTO: 4.72 10*6/MM3 (ref 3.9–5.2)
SODIUM BLD-SCNC: 143 MMOL/L (ref 136–145)
SP GR UR STRIP: 1.02 (ref 1–1.03)
UROBILINOGEN UR QL STRIP: ABNORMAL
WBC NRBC COR # BLD: 6.43 10*3/MM3 (ref 4.5–10.7)

## 2019-01-25 PROCEDURE — 80048 BASIC METABOLIC PNL TOTAL CA: CPT | Performed by: ORTHOPAEDIC SURGERY

## 2019-01-25 PROCEDURE — 85027 COMPLETE CBC AUTOMATED: CPT | Performed by: ORTHOPAEDIC SURGERY

## 2019-01-25 PROCEDURE — 36415 COLL VENOUS BLD VENIPUNCTURE: CPT

## 2019-01-25 PROCEDURE — 81003 URINALYSIS AUTO W/O SCOPE: CPT | Performed by: ORTHOPAEDIC SURGERY

## 2019-01-25 RX ORDER — CHLORHEXIDINE GLUCONATE 500 MG/1
1 CLOTH TOPICAL TAKE AS DIRECTED
COMMUNITY
End: 2019-02-08 | Stop reason: HOSPADM

## 2019-01-25 ASSESSMENT — KOOS JR
KOOS JR SCORE: 5
KOOS JR SCORE: 73.342

## 2019-01-25 NOTE — DISCHARGE INSTRUCTIONS
Take the following medications the morning of surgery with a small sip of water:        General Instructions:  • Do not eat solid food after midnight the night before surgery.  • You may drink clear liquids day of surgery but must stop at least one hour before your hospital arrival time.  • It is beneficial for you to have a clear drink that contains carbohydrates the day of surgery.  We suggest a 12 to 20 ounce bottle of Gatorade or Powerade for non-diabetic patients or a 12 to 20 ounce bottle of G2 or Powerade Zero for diabetic patients. (Pediatric patients, are not advised to drink a 12 to 20 ounce carbohydrate drink)    Clear liquids are liquids you can see through.  Nothing red in color.     Plain water                               Sports drinks  Sodas                                   Gelatin (Jell-O)  Fruit juices without pulp such as white grape juice and apple juice  Popsicles that contain no fruit or yogurt  Tea or coffee (no cream or milk added)  Gatorade / Powerade  G2 / Powerade Zero    • Infants may have breast milk up to four hours before surgery.  • Infants drinking formula may drink formula up to six hours before surgery.   • Patients who avoid smoking, chewing tobacco and alcohol for 4 weeks prior to surgery have a reduced risk of post-operative complications.  Quit smoking as many days before surgery as you can.  • Do not smoke, use chewing tobacco or drink alcohol the day of surgery.   • If applicable bring your C-PAP/ BI-PAP machine.  • Bring any papers given to you in the doctor’s office.  • Wear clean comfortable clothes and socks.  • Do not wear contact lenses or make-up.  Bring a case for your glasses.   • Bring crutches or walker if applicable.  • Remove all piercings.  Leave jewelry and any other valuables at home.  • Hair extensions with metal clips must be removed prior to surgery.  • The Pre-Admission Testing nurse will instruct you to bring medications if unable to obtain an accurate  list in Pre-Admission Testing.        If you were given a blood bank ID arm band remember to bring it with you the day of surgery.    Preventing a Surgical Site Infection:  • For 2 to 3 days before surgery, avoid shaving with a razor because the razor can irritate skin and make it easier to develop an infection.    • Any areas of open skin can increase the risk of a post-operative wound infection by allowing bacteria to enter and travel throughout the body.  Notify your surgeon if you have any skin wounds / rashes even if it is not near the expected surgical site.  The area will need assessed to determine if surgery should be delayed until it is healed.  • The night prior to surgery sleep in a clean bed with clean clothing.  Do not allow pets to sleep with you.  • Shower on the morning of surgery using a fresh bar of anti-bacterial soap (such as Dial) and clean washcloth.  Dry with a clean towel and dress in clean clothing.  • Ask your surgeon if you will be receiving antibiotics prior to surgery.  • Make sure you, your family, and all healthcare providers clean their hands with soap and water or an alcohol based hand  before caring for you or your wound.    Day of surgery:  Upon arrival, a Pre-op nurse and Anesthesiologist will review your health history, obtain vital signs, and answer questions you may have.  The only belongings needed at this time will be your home medications and if applicable your C-PAP/BI-PAP machine.  If you are staying overnight your family can leave the rest of your belongings in the car and bring them to your room later.  A Pre-op nurse will start an IV and you may receive medication in preparation for surgery, including something to help you relax.  Your family will be able to see you in the Pre-op area.  While you are in surgery your family should notify the waiting room  if they leave the waiting room area and provide a contact phone number.    Please be aware that  surgery does come with discomfort.  We want to make every effort to control your discomfort so please discuss any uncontrolled symptoms with your nurse.   Your doctor will most likely have prescribed pain medications.      If you are going home after surgery you will receive individualized written care instructions before being discharged.  A responsible adult must drive you to and from the hospital on the day of your surgery and stay with you for 24 hours.    If you are staying overnight following surgery, you will be transported to your hospital room following the recovery period.  [facility] has all private rooms.    You have received a list of surgical assistants for your reference.  If you have any questions please call Pre-Admission Testing at [phone].  Deductibles and co-payments are collected on the day of service. Please be prepared to pay the required co-pay, deductible or deposit on the day of service as defined by your plan.    2% CHLORAHEXIDINE GLUCONATE* CLOTH  Preparing or “prepping” skin before surgery can reduce the risk of infection at the surgical site. To make the process easier, [facility] has chosen disposable cloths moistened with a rinse-free, 2% Chlorhexidine Gluconate (CHG) antiseptic solution. The steps below outline the prepping process and should be carefully followed.        Use the prep cloth on the area that is circled in the diagram             Directions Night before Surgery  1) Shower using a fresh bar of anti-bacterial soap (such as Dial) and clean washcloth.  Use a clean towel to completely dry your skin.  2) Do not use any lotions, oils or creams on your skin.  3) Open the package and remove 1 cloth, wipe your skin for 30 seconds in a circular motion.  Allow to dry for 3 minutes.  4) Repeat #3 with second cloth.  5) Do not touch your eyes, ears, or mouth with the prep cloth.  6) Allow the wet prep solution to air dry.  7) Discard the prep cloth and  wash your hands with soap and water.   8) Dress in clean bed clothes and sleep on fresh clean bed sheets.   9) You may experience some temporary itching after the prep.    Directions Day of Surgery  1) Repeat steps 1,2,3,4,5,6,7, and 9.   2) Dress in clean clothes before coming to the hospital.    BACTROBAN NASAL OINTMENT  There are many germs normally in your nose. Bactroban is an ointment that will help reduce these germs. Please follow these instructions for Bactroban use:      ____The day before surgery in the morning  Date________    ____The day before surgery in the evening              Date________    ____The day of surgery in the morning    Date________    **Squirt ½ package of Bactroban Ointment onto a cotton applicator and apply to inside of 1st nostril.  Squirt the remaining Bactroban and apply to the inside of the other nostril.    PERIDEX- ORAL:  Use only if your surgeon has ordered  Use the night before and morning of surgery - Swish, gargle, and spit - do not swallow.

## 2019-01-31 ENCOUNTER — OFFICE VISIT (OUTPATIENT)
Dept: ORTHOPEDIC SURGERY | Facility: CLINIC | Age: 54
End: 2019-01-31

## 2019-01-31 VITALS
BODY MASS INDEX: 27.84 KG/M2 | WEIGHT: 177.4 LBS | HEIGHT: 67 IN | DIASTOLIC BLOOD PRESSURE: 80 MMHG | SYSTOLIC BLOOD PRESSURE: 110 MMHG | TEMPERATURE: 97.1 F

## 2019-01-31 DIAGNOSIS — M17.11 PRIMARY OSTEOARTHRITIS OF RIGHT KNEE: Primary | ICD-10-CM

## 2019-01-31 PROCEDURE — S0260 H&P FOR SURGERY: HCPCS | Performed by: NURSE PRACTITIONER

## 2019-01-31 PROCEDURE — 77077 JOINT SURVEY SINGLE VIEW: CPT | Performed by: ORTHOPAEDIC SURGERY

## 2019-01-31 NOTE — H&P
Patient: Eula Navarrete    Date of Admission: 2/8/19    YOB: 1965    Medical Record Number: [id number]    Admitting Physician: Dr. Ronnie Nguyen    Reason for Admission: End Stage Right Medial Knee OA    History of Present Illness: 53 y.o. female presents with severe end stage medial compartment knee osteoarthritis which has not been responsive to the full compliment of conservative measures. Despite conservative attempts, there is still severe, constant activity limiting pain. Given the severity of the pain, the patient has elected to proceed with knee replacement.    Allergies:   Allergies   Allergen Reactions   • Tape Itching and Rash     kinesio tape         Current Medications:  Scheduled Meds:  PRN Meds:.    PMH:  Past Medical History:   Diagnosis Date   • Arthritis    • Baker's cyst appears to have one   • DDD (degenerative disc disease), cervical    • Ganglion cyst    • GERD (gastroesophageal reflux disease)    • Hyperlipidemia    • Knee pain     RIGHT   • PONV (postoperative nausea and vomiting)        PSurg/Soc/Fam Hx:     Past Surgical History:   Procedure Laterality Date   • HYSTERECTOMY  2005    Partial   • KNEE ARTHROSCOPY  R knee arthoscopy   • KNEE MENISCAL REPAIR     • TUBAL ABDOMINAL LIGATION          Social History     Occupational History   • Not on file   Tobacco Use   • Smoking status: Never Smoker   • Smokeless tobacco: Never Used   Substance and Sexual Activity   • Alcohol use: Yes     Types: 1 - 2 Glasses of wine, 1 - 3 Cans of beer per week     Comment: social drinker   • Drug use: No   • Sexual activity: Yes     Partners: Male     Birth control/protection: Surgical      Social History     Social History Narrative   • Not on file        Family History   Problem Relation Age of Onset   • Cancer Sister    • Malig Hyperthermia Neg Hx          Review of Systems:   A 14 point review of systems was performed, pertinent positives discussed above, all other systems are  "negative    Physical Exam: 53 y.o. female  Vital Signs :   Vitals:    01/31/19 1503   BP: 110/80   BP Location: Left arm   Patient Position: Sitting   Cuff Size: Adult   Temp: 97.1 °F (36.2 °C)   TempSrc: Temporal   Weight: 80.5 kg (177 lb 6.4 oz)   Height: 170.8 cm (67.25\")     General: Alert and Oriented x 3, No acute distress.  Psych: mood and affect appropriate; recent and remote memory intact  Eyes: conjunctiva clear; pupils equally round and reactive, sclera nonicteric  CV: no peripheral edema  Resp: normal respiratory effort  Skin: no rashes or wounds; normal turgor  Musculosketetal: pain localized to the medial aspect of the knee. Neutral alignment in stance. No patellofemoral crepitance or pain with patellofemoral grind. Negative Lachman  Vascular: palpable distal pulses    Xrays:  -3 views (AP, lateral, and sunrise) were reviewed demonstrating end-stage isolated medial compartment OA with medial bone on bone articulation. The lateral and patellofemoral compartments are well preserved.  -A full length AP xray was ordered today for purposes of operative alignment demonstrating end stage medial compartment arthritic findings. There are no previous full length films for review    Assessment:  End-stage Right knee medial compartment osteoarthritis. Conservative measures have failed.      Plan:  The plan is to proceed with Right Unicompartmental Knee Replacement possible Total Knee Replacement. The patient voiced understanding of the risks, benefits, and alternative forms of treatment that were discussed with Dr Nguyen at the time of scheduling.  Patient is planning on going home same day.  She has had issues with tape and adhesives in the past so no Mastisol    Park Shields, APRN  1/31/2019  "

## 2019-02-08 ENCOUNTER — ANESTHESIA (OUTPATIENT)
Dept: PERIOP | Facility: HOSPITAL | Age: 54
End: 2019-02-08

## 2019-02-08 ENCOUNTER — APPOINTMENT (OUTPATIENT)
Dept: GENERAL RADIOLOGY | Facility: HOSPITAL | Age: 54
End: 2019-02-08

## 2019-02-08 ENCOUNTER — HOSPITAL ENCOUNTER (OUTPATIENT)
Facility: HOSPITAL | Age: 54
Discharge: HOME HEALTH | End: 2019-02-08
Attending: ORTHOPAEDIC SURGERY | Admitting: ORTHOPAEDIC SURGERY

## 2019-02-08 ENCOUNTER — ANESTHESIA EVENT (OUTPATIENT)
Dept: PERIOP | Facility: HOSPITAL | Age: 54
End: 2019-02-08

## 2019-02-08 VITALS
RESPIRATION RATE: 16 BRPM | HEART RATE: 69 BPM | TEMPERATURE: 96.9 F | SYSTOLIC BLOOD PRESSURE: 120 MMHG | HEIGHT: 68 IN | BODY MASS INDEX: 26.98 KG/M2 | DIASTOLIC BLOOD PRESSURE: 74 MMHG | OXYGEN SATURATION: 97 % | WEIGHT: 178 LBS

## 2019-02-08 DIAGNOSIS — M17.11 PRIMARY OSTEOARTHRITIS OF RIGHT KNEE: ICD-10-CM

## 2019-02-08 PROBLEM — M17.9 OA (OSTEOARTHRITIS) OF KNEE: Status: ACTIVE | Noted: 2019-02-08

## 2019-02-08 PROCEDURE — C1713 ANCHOR/SCREW BN/BN,TIS/BN: HCPCS | Performed by: ORTHOPAEDIC SURGERY

## 2019-02-08 PROCEDURE — C1776 JOINT DEVICE (IMPLANTABLE): HCPCS | Performed by: ORTHOPAEDIC SURGERY

## 2019-02-08 PROCEDURE — 25010000003 BUPIVACAINE LIPOSOME 1.3 % SUSPENSION 20 ML VIAL: Performed by: ORTHOPAEDIC SURGERY

## 2019-02-08 PROCEDURE — 25010000002 DEXAMETHASONE PER 1 MG: Performed by: NURSE ANESTHETIST, CERTIFIED REGISTERED

## 2019-02-08 PROCEDURE — 73560 X-RAY EXAM OF KNEE 1 OR 2: CPT

## 2019-02-08 PROCEDURE — 25010000002 ONDANSETRON PER 1 MG: Performed by: NURSE ANESTHETIST, CERTIFIED REGISTERED

## 2019-02-08 PROCEDURE — 97530 THERAPEUTIC ACTIVITIES: CPT

## 2019-02-08 PROCEDURE — 97161 PT EVAL LOW COMPLEX 20 MIN: CPT

## 2019-02-08 PROCEDURE — 27446 REVISION OF KNEE JOINT: CPT | Performed by: NURSE PRACTITIONER

## 2019-02-08 PROCEDURE — 25010000002 KETOROLAC TROMETHAMINE PER 15 MG: Performed by: NURSE PRACTITIONER

## 2019-02-08 PROCEDURE — 25010000003 CEFAZOLIN IN DEXTROSE 2-4 GM/100ML-% SOLUTION: Performed by: ORTHOPAEDIC SURGERY

## 2019-02-08 PROCEDURE — 25010000002 MIDAZOLAM PER 1 MG: Performed by: ANESTHESIOLOGY

## 2019-02-08 PROCEDURE — C9290 INJ, BUPIVACAINE LIPOSOME: HCPCS | Performed by: ORTHOPAEDIC SURGERY

## 2019-02-08 PROCEDURE — 25010000002 PHENYLEPHRINE PER 1 ML: Performed by: NURSE ANESTHETIST, CERTIFIED REGISTERED

## 2019-02-08 PROCEDURE — 97116 GAIT TRAINING THERAPY: CPT

## 2019-02-08 PROCEDURE — 27446 REVISION OF KNEE JOINT: CPT | Performed by: ORTHOPAEDIC SURGERY

## 2019-02-08 PROCEDURE — 25010000003 CEFAZOLIN IN DEXTROSE 2-4 GM/100ML-% SOLUTION: Performed by: NURSE PRACTITIONER

## 2019-02-08 PROCEDURE — 25010000002 VANCOMYCIN 10 G RECONSTITUTED SOLUTION: Performed by: ORTHOPAEDIC SURGERY

## 2019-02-08 PROCEDURE — 25010000002 PROPOFOL 10 MG/ML EMULSION: Performed by: NURSE ANESTHETIST, CERTIFIED REGISTERED

## 2019-02-08 DEVICE — IMPLANTABLE DEVICE: Type: IMPLANTABLE DEVICE | Site: KNEE | Status: FUNCTIONAL

## 2019-02-08 DEVICE — CMT BONE PALACOS R HI/VISC 1X40: Type: IMPLANTABLE DEVICE | Site: KNEE | Status: FUNCTIONAL

## 2019-02-08 DEVICE — IMPLANTABLE DEVICE: Type: IMPLANTABLE DEVICE | Status: FUNCTIONAL

## 2019-02-08 DEVICE — COMP FEM UNI HIFLEX PRECOAT RM/LL SZC: Type: IMPLANTABLE DEVICE | Site: KNEE | Status: FUNCTIONAL

## 2019-02-08 RX ORDER — FLUMAZENIL 0.1 MG/ML
0.2 INJECTION INTRAVENOUS AS NEEDED
Status: DISCONTINUED | OUTPATIENT
Start: 2019-02-08 | End: 2019-02-08 | Stop reason: HOSPADM

## 2019-02-08 RX ORDER — LIDOCAINE HYDROCHLORIDE 10 MG/ML
0.5 INJECTION, SOLUTION EPIDURAL; INFILTRATION; INTRACAUDAL; PERINEURAL ONCE AS NEEDED
Status: DISCONTINUED | OUTPATIENT
Start: 2019-02-08 | End: 2019-02-08 | Stop reason: HOSPADM

## 2019-02-08 RX ORDER — ONDANSETRON 4 MG/1
4 TABLET, ORALLY DISINTEGRATING ORAL EVERY 6 HOURS PRN
Status: DISCONTINUED | OUTPATIENT
Start: 2019-02-08 | End: 2019-02-08 | Stop reason: HOSPADM

## 2019-02-08 RX ORDER — FENTANYL CITRATE 50 UG/ML
50 INJECTION, SOLUTION INTRAMUSCULAR; INTRAVENOUS
Status: DISCONTINUED | OUTPATIENT
Start: 2019-02-08 | End: 2019-02-08 | Stop reason: HOSPADM

## 2019-02-08 RX ORDER — KETOROLAC TROMETHAMINE 30 MG/ML
30 INJECTION, SOLUTION INTRAMUSCULAR; INTRAVENOUS EVERY 8 HOURS
Status: DISCONTINUED | OUTPATIENT
Start: 2019-02-08 | End: 2019-02-08 | Stop reason: HOSPADM

## 2019-02-08 RX ORDER — DIPHENHYDRAMINE HYDROCHLORIDE 50 MG/ML
12.5 INJECTION INTRAMUSCULAR; INTRAVENOUS
Status: DISCONTINUED | OUTPATIENT
Start: 2019-02-08 | End: 2019-02-08 | Stop reason: HOSPADM

## 2019-02-08 RX ORDER — CEFAZOLIN SODIUM 2 G/100ML
2 INJECTION, SOLUTION INTRAVENOUS EVERY 8 HOURS
Status: DISCONTINUED | OUTPATIENT
Start: 2019-02-08 | End: 2019-02-08 | Stop reason: HOSPADM

## 2019-02-08 RX ORDER — PANTOPRAZOLE SODIUM 40 MG/1
40 TABLET, DELAYED RELEASE ORAL EVERY MORNING
Qty: 14 TABLET | Refills: 0 | Status: SHIPPED | OUTPATIENT
Start: 2019-02-09 | End: 2019-02-23

## 2019-02-08 RX ORDER — MAGNESIUM HYDROXIDE 1200 MG/15ML
LIQUID ORAL AS NEEDED
Status: DISCONTINUED | OUTPATIENT
Start: 2019-02-08 | End: 2019-02-08 | Stop reason: HOSPADM

## 2019-02-08 RX ORDER — PROMETHAZINE HYDROCHLORIDE 25 MG/ML
12.5 INJECTION, SOLUTION INTRAMUSCULAR; INTRAVENOUS ONCE AS NEEDED
Status: DISCONTINUED | OUTPATIENT
Start: 2019-02-08 | End: 2019-02-08 | Stop reason: HOSPADM

## 2019-02-08 RX ORDER — ACETAMINOPHEN 325 MG/1
650 TABLET ORAL ONCE AS NEEDED
Status: DISCONTINUED | OUTPATIENT
Start: 2019-02-08 | End: 2019-02-08 | Stop reason: HOSPADM

## 2019-02-08 RX ORDER — HYDROCODONE BITARTRATE AND ACETAMINOPHEN 7.5; 325 MG/1; MG/1
1 TABLET ORAL ONCE AS NEEDED
Status: DISCONTINUED | OUTPATIENT
Start: 2019-02-08 | End: 2019-02-08 | Stop reason: HOSPADM

## 2019-02-08 RX ORDER — ONDANSETRON 2 MG/ML
INJECTION INTRAMUSCULAR; INTRAVENOUS AS NEEDED
Status: DISCONTINUED | OUTPATIENT
Start: 2019-02-08 | End: 2019-02-08 | Stop reason: SURG

## 2019-02-08 RX ORDER — HYDRALAZINE HYDROCHLORIDE 20 MG/ML
5 INJECTION INTRAMUSCULAR; INTRAVENOUS
Status: DISCONTINUED | OUTPATIENT
Start: 2019-02-08 | End: 2019-02-08 | Stop reason: HOSPADM

## 2019-02-08 RX ORDER — ONDANSETRON 4 MG/1
4 TABLET, FILM COATED ORAL EVERY 6 HOURS PRN
Qty: 10 TABLET | Refills: 0 | Status: SHIPPED | OUTPATIENT
Start: 2019-02-08 | End: 2020-02-11

## 2019-02-08 RX ORDER — MIDAZOLAM HYDROCHLORIDE 1 MG/ML
1 INJECTION INTRAMUSCULAR; INTRAVENOUS
Status: DISCONTINUED | OUTPATIENT
Start: 2019-02-08 | End: 2019-02-08 | Stop reason: HOSPADM

## 2019-02-08 RX ORDER — EPHEDRINE SULFATE 50 MG/ML
5 INJECTION, SOLUTION INTRAVENOUS ONCE AS NEEDED
Status: DISCONTINUED | OUTPATIENT
Start: 2019-02-08 | End: 2019-02-08 | Stop reason: HOSPADM

## 2019-02-08 RX ORDER — BISACODYL 10 MG
10 SUPPOSITORY, RECTAL RECTAL DAILY PRN
Status: DISCONTINUED | OUTPATIENT
Start: 2019-02-08 | End: 2019-02-08 | Stop reason: HOSPADM

## 2019-02-08 RX ORDER — HYDROCODONE BITARTRATE AND ACETAMINOPHEN 7.5; 325 MG/1; MG/1
1 TABLET ORAL EVERY 4 HOURS PRN
Qty: 30 TABLET | Refills: 0 | Status: SHIPPED | OUTPATIENT
Start: 2019-02-08 | End: 2019-03-10

## 2019-02-08 RX ORDER — WOUND DRESSING ADHESIVE - LIQUID
LIQUID MISCELLANEOUS AS NEEDED
Status: DISCONTINUED | OUTPATIENT
Start: 2019-02-08 | End: 2019-02-08 | Stop reason: HOSPADM

## 2019-02-08 RX ORDER — PREGABALIN 75 MG/1
150 CAPSULE ORAL ONCE
Status: COMPLETED | OUTPATIENT
Start: 2019-02-08 | End: 2019-02-08

## 2019-02-08 RX ORDER — DOCUSATE SODIUM 100 MG/1
100 CAPSULE, LIQUID FILLED ORAL 2 TIMES DAILY
Status: DISCONTINUED | OUTPATIENT
Start: 2019-02-08 | End: 2019-02-08 | Stop reason: HOSPADM

## 2019-02-08 RX ORDER — FAMOTIDINE 10 MG/ML
20 INJECTION, SOLUTION INTRAVENOUS ONCE
Status: COMPLETED | OUTPATIENT
Start: 2019-02-08 | End: 2019-02-08

## 2019-02-08 RX ORDER — ACETAMINOPHEN 10 MG/ML
1000 INJECTION, SOLUTION INTRAVENOUS ONCE
Status: COMPLETED | OUTPATIENT
Start: 2019-02-08 | End: 2019-02-08

## 2019-02-08 RX ORDER — DIPHENHYDRAMINE HCL 25 MG
25 CAPSULE ORAL
Status: DISCONTINUED | OUTPATIENT
Start: 2019-02-08 | End: 2019-02-08 | Stop reason: HOSPADM

## 2019-02-08 RX ORDER — DEXAMETHASONE SODIUM PHOSPHATE 4 MG/ML
INJECTION, SOLUTION INTRA-ARTICULAR; INTRALESIONAL; INTRAMUSCULAR; INTRAVENOUS; SOFT TISSUE AS NEEDED
Status: DISCONTINUED | OUTPATIENT
Start: 2019-02-08 | End: 2019-02-08 | Stop reason: SURG

## 2019-02-08 RX ORDER — ONDANSETRON 2 MG/ML
4 INJECTION INTRAMUSCULAR; INTRAVENOUS ONCE AS NEEDED
Status: COMPLETED | OUTPATIENT
Start: 2019-02-08 | End: 2019-02-08

## 2019-02-08 RX ORDER — HYDROCODONE BITARTRATE AND ACETAMINOPHEN 7.5; 325 MG/1; MG/1
1 TABLET ORAL EVERY 4 HOURS PRN
Status: DISCONTINUED | OUTPATIENT
Start: 2019-02-08 | End: 2019-02-08 | Stop reason: HOSPADM

## 2019-02-08 RX ORDER — OXYCODONE AND ACETAMINOPHEN 7.5; 325 MG/1; MG/1
1 TABLET ORAL ONCE AS NEEDED
Status: DISCONTINUED | OUTPATIENT
Start: 2019-02-08 | End: 2019-02-08 | Stop reason: HOSPADM

## 2019-02-08 RX ORDER — SODIUM CHLORIDE 0.9 % (FLUSH) 0.9 %
1-10 SYRINGE (ML) INJECTION AS NEEDED
Status: DISCONTINUED | OUTPATIENT
Start: 2019-02-08 | End: 2019-02-08 | Stop reason: HOSPADM

## 2019-02-08 RX ORDER — TRANEXAMIC ACID 100 MG/ML
INJECTION, SOLUTION INTRAVENOUS AS NEEDED
Status: DISCONTINUED | OUTPATIENT
Start: 2019-02-08 | End: 2019-02-08 | Stop reason: SURG

## 2019-02-08 RX ORDER — UREA 10 %
3 LOTION (ML) TOPICAL NIGHTLY PRN
Status: DISCONTINUED | OUTPATIENT
Start: 2019-02-08 | End: 2019-02-08 | Stop reason: HOSPADM

## 2019-02-08 RX ORDER — ASPIRIN 325 MG
325 TABLET, DELAYED RELEASE (ENTERIC COATED) ORAL EVERY 12 HOURS SCHEDULED
Status: DISCONTINUED | OUTPATIENT
Start: 2019-02-09 | End: 2019-02-08 | Stop reason: HOSPADM

## 2019-02-08 RX ORDER — CEFAZOLIN SODIUM 2 G/100ML
2 INJECTION, SOLUTION INTRAVENOUS ONCE
Status: COMPLETED | OUTPATIENT
Start: 2019-02-08 | End: 2019-02-08

## 2019-02-08 RX ORDER — MELOXICAM 15 MG/1
15 TABLET ORAL DAILY
Status: DISCONTINUED | OUTPATIENT
Start: 2019-02-09 | End: 2019-02-08 | Stop reason: HOSPADM

## 2019-02-08 RX ORDER — HYDROMORPHONE HYDROCHLORIDE 1 MG/ML
0.5 INJECTION, SOLUTION INTRAMUSCULAR; INTRAVENOUS; SUBCUTANEOUS
Status: DISCONTINUED | OUTPATIENT
Start: 2019-02-08 | End: 2019-02-08 | Stop reason: HOSPADM

## 2019-02-08 RX ORDER — MELOXICAM 15 MG/1
15 TABLET ORAL ONCE
Status: COMPLETED | OUTPATIENT
Start: 2019-02-08 | End: 2019-02-08

## 2019-02-08 RX ORDER — HYDROCODONE BITARTRATE AND ACETAMINOPHEN 7.5; 325 MG/1; MG/1
2 TABLET ORAL EVERY 4 HOURS PRN
Status: DISCONTINUED | OUTPATIENT
Start: 2019-02-08 | End: 2019-02-08 | Stop reason: HOSPADM

## 2019-02-08 RX ORDER — PANTOPRAZOLE SODIUM 40 MG/1
40 TABLET, DELAYED RELEASE ORAL EVERY MORNING
Status: DISCONTINUED | OUTPATIENT
Start: 2019-02-08 | End: 2019-02-08 | Stop reason: HOSPADM

## 2019-02-08 RX ORDER — PROPOFOL 10 MG/ML
VIAL (ML) INTRAVENOUS CONTINUOUS PRN
Status: DISCONTINUED | OUTPATIENT
Start: 2019-02-08 | End: 2019-02-08 | Stop reason: SURG

## 2019-02-08 RX ORDER — SODIUM CHLORIDE, SODIUM LACTATE, POTASSIUM CHLORIDE, CALCIUM CHLORIDE 600; 310; 30; 20 MG/100ML; MG/100ML; MG/100ML; MG/100ML
9 INJECTION, SOLUTION INTRAVENOUS CONTINUOUS
Status: DISCONTINUED | OUTPATIENT
Start: 2019-02-08 | End: 2019-02-08 | Stop reason: HOSPADM

## 2019-02-08 RX ORDER — ONDANSETRON 4 MG/1
4 TABLET, FILM COATED ORAL EVERY 6 HOURS PRN
Status: DISCONTINUED | OUTPATIENT
Start: 2019-02-08 | End: 2019-02-08 | Stop reason: HOSPADM

## 2019-02-08 RX ORDER — POLYETHYLENE GLYCOL 3350 17 G/17G
17 POWDER, FOR SOLUTION ORAL 2 TIMES DAILY
Qty: 238 G | Refills: 0 | Status: SHIPPED | OUTPATIENT
Start: 2019-02-08 | End: 2019-02-15

## 2019-02-08 RX ORDER — LABETALOL HYDROCHLORIDE 5 MG/ML
5 INJECTION, SOLUTION INTRAVENOUS
Status: DISCONTINUED | OUTPATIENT
Start: 2019-02-08 | End: 2019-02-08 | Stop reason: HOSPADM

## 2019-02-08 RX ORDER — PROMETHAZINE HYDROCHLORIDE 25 MG/1
25 TABLET ORAL ONCE AS NEEDED
Status: DISCONTINUED | OUTPATIENT
Start: 2019-02-08 | End: 2019-02-08 | Stop reason: HOSPADM

## 2019-02-08 RX ORDER — PROMETHAZINE HYDROCHLORIDE 25 MG/1
25 SUPPOSITORY RECTAL ONCE AS NEEDED
Status: DISCONTINUED | OUTPATIENT
Start: 2019-02-08 | End: 2019-02-08 | Stop reason: HOSPADM

## 2019-02-08 RX ORDER — NALOXONE HCL 0.4 MG/ML
0.2 VIAL (ML) INJECTION AS NEEDED
Status: DISCONTINUED | OUTPATIENT
Start: 2019-02-08 | End: 2019-02-08 | Stop reason: HOSPADM

## 2019-02-08 RX ORDER — ONDANSETRON 2 MG/ML
4 INJECTION INTRAMUSCULAR; INTRAVENOUS EVERY 6 HOURS PRN
Status: DISCONTINUED | OUTPATIENT
Start: 2019-02-08 | End: 2019-02-08 | Stop reason: HOSPADM

## 2019-02-08 RX ORDER — MIDAZOLAM HYDROCHLORIDE 1 MG/ML
2 INJECTION INTRAMUSCULAR; INTRAVENOUS
Status: DISCONTINUED | OUTPATIENT
Start: 2019-02-08 | End: 2019-02-08 | Stop reason: HOSPADM

## 2019-02-08 RX ADMIN — PHENYLEPHRINE HYDROCHLORIDE 200 MCG: 10 INJECTION INTRAVENOUS at 08:13

## 2019-02-08 RX ADMIN — ONDANSETRON 4 MG: 2 INJECTION INTRAMUSCULAR; INTRAVENOUS at 09:17

## 2019-02-08 RX ADMIN — PREGABALIN 150 MG: 75 CAPSULE ORAL at 06:10

## 2019-02-08 RX ADMIN — ONDANSETRON 4 MG: 4 TABLET, ORALLY DISINTEGRATING ORAL at 17:54

## 2019-02-08 RX ADMIN — SODIUM CHLORIDE, POTASSIUM CHLORIDE, SODIUM LACTATE AND CALCIUM CHLORIDE 9 ML/HR: 600; 310; 30; 20 INJECTION, SOLUTION INTRAVENOUS at 06:53

## 2019-02-08 RX ADMIN — KETOROLAC TROMETHAMINE 30 MG: 30 INJECTION, SOLUTION INTRAMUSCULAR at 13:53

## 2019-02-08 RX ADMIN — PHENYLEPHRINE HYDROCHLORIDE 200 MCG: 10 INJECTION INTRAVENOUS at 08:23

## 2019-02-08 RX ADMIN — VANCOMYCIN HYDROCHLORIDE 1250 MG: 10 INJECTION, POWDER, LYOPHILIZED, FOR SOLUTION INTRAVENOUS at 06:13

## 2019-02-08 RX ADMIN — SODIUM CHLORIDE, POTASSIUM CHLORIDE, SODIUM LACTATE AND CALCIUM CHLORIDE 9 ML/HR: 600; 310; 30; 20 INJECTION, SOLUTION INTRAVENOUS at 10:34

## 2019-02-08 RX ADMIN — MIDAZOLAM 1 MG: 1 INJECTION INTRAMUSCULAR; INTRAVENOUS at 06:51

## 2019-02-08 RX ADMIN — CEFAZOLIN SODIUM 2 G: 2 INJECTION, SOLUTION INTRAVENOUS at 07:01

## 2019-02-08 RX ADMIN — ONDANSETRON 4 MG: 2 INJECTION INTRAMUSCULAR; INTRAVENOUS at 08:25

## 2019-02-08 RX ADMIN — SODIUM CHLORIDE, POTASSIUM CHLORIDE, SODIUM LACTATE AND CALCIUM CHLORIDE 500 ML: 600; 310; 30; 20 INJECTION, SOLUTION INTRAVENOUS at 06:11

## 2019-02-08 RX ADMIN — FAMOTIDINE 20 MG: 10 INJECTION, SOLUTION INTRAVENOUS at 06:51

## 2019-02-08 RX ADMIN — DEXAMETHASONE SODIUM PHOSPHATE 8 MG: 4 INJECTION INTRA-ARTICULAR; INTRALESIONAL; INTRAMUSCULAR; INTRAVENOUS; SOFT TISSUE at 07:16

## 2019-02-08 RX ADMIN — MELOXICAM 15 MG: 15 TABLET ORAL at 06:11

## 2019-02-08 RX ADMIN — TRANEXAMIC ACID 1000 MG: 100 INJECTION, SOLUTION INTRAVENOUS at 08:03

## 2019-02-08 RX ADMIN — CEFAZOLIN SODIUM 2 G: 2 INJECTION, SOLUTION INTRAVENOUS at 16:08

## 2019-02-08 RX ADMIN — PROPOFOL 75 MCG/KG/MIN: 10 INJECTION, EMULSION INTRAVENOUS at 07:15

## 2019-02-08 RX ADMIN — ACETAMINOPHEN 1000 MG: 10 INJECTION, SOLUTION INTRAVENOUS at 07:15

## 2019-02-08 NOTE — OP NOTE
Name: Eula Navarrete  YOB: 1965    DATE OF SURGERY: 2/8/2019    PREOPERATIVE DIAGNOSIS: Right knee end-stage medial compartment osteoarthritis    POSTOPERATIVE DIAGNOSIS: Right knee end-stage compartment osteoarthritis    PROCEDURE PERFORMED: Right unicompartmental knee replacement    SURGEON: Ronnie Nguyen M.D.    ASSISTANT: PENNY SHAH    IMPLANTS:   Implant Name Type Inv. Item Serial No.  Lot No. LRB No. Used   CMT BONE PALACOS R HI/VISC 1X40 - [id number] Implant CMT BONE PALACOS R HI/VISC 1X40  [facility] [id number] Right 1   COMP FEM UNI HIFLEX PRECOAT RM/LL SZC - [id number] Implant COMP FEM UNI HIFLEX PRECOAT RM/LL SZC  [employer] [id number] Right 1   COMP TIB UNI HF PCOAT RM/LL SZ4 - [id number] Implant COMP TIB UNI HF PCOAT RM/LL SZ4  [employer] [id number] Right 1   ART/SRF KN UNICOMPT HXPE SZ4 8MM - [id number] Implant ART/SRF KN UNICOMPT HXPE SZ4 8MM  [employer] [id number] Right 1       Estimated Blood Loss: 100cc  Specimens : none  Complications: none    DESCRIPTION OF PROCEDURE: The patient was taken to the operating room and placed in the supine position. A sequential compression device was carefully placed on the non-operative leg. Preoperative antibiotics were administered. Surgical time out was performed. After adequate induction of anesthesia, the leg was prepped and draped in the usual sterile fashion, exsanguinated with an Esmarch bandage and the tourniquet inflated to 250 mmHg. A slightly medial to midline incision was performed followed by a mini-midvastus arthrotomy. The patella was partially subluxed laterally.  I then carefully examined all 3 compartments of the knee.  There was end-stage medial compartment osteoarthritis with bone-on-bone articulation.  The ACL was normal.  The patellofemoral joint was normal.  The lateral joint was normal.  I then used the tibial extramedullary cutting guide.  It was set with the 4 mm stylus to remove 4 mm of medial bone at  the thickest point.  The cutting guide was pinned in place and the tibial cut was performed.  The tibial cut was then removed.  We then used the flexion extension blocks to carefully examine the balance between flexion and extension.  At this point the block was placed with the leg in full extension and the distal femoral cutting block was placed.  The distal femoral cut was performed.  We then flexed the knee to roughly 90° and again checked the balance in flexion and extension.  The knee was then balanced.  We then sized the femoral component at 90° of flexion the appropriate cutting guide was pinned in place.  All femoral cuts were then performed.    The medial meniscus and remnants of remnants of excessive posterior capsule were excised.  We then again flexed the knee and sized the tibial cut surface.  The tibial trial was then placed and the fixation lugs were drilled.  The trial components were then placed.  The trial spacer was then placed.  The knee was taken through a full range of motion.  The knee was nicely balanced and the 2 mm dunia easily fit in both flexion and extension with equal balance.  The trial components were then removed the knee was copiously irrigated with pulsed lavage and then injected with anesthetic cocktail.  The cut surfaces were then dried with clean laparotomy sponges.  The components were then cemented tibia followed by femur.  The excess cement was removed and the knee was held in full extension with a 2 mm dunia in place.  After the cement had completely hardened we again copiously irrigated the knee and chose the final polyethylene insert which was equal to the trial which gave nice balance and good soft tissue tension without being overly tight.  The knee was then closed in multiple layers in standard fashion and the patient was extubated and transferred to the PACU for recovery from anesthesia.  At the end of the case, the sponge and needle counts were reported as being  correct. There were no known complications. The patient was then transported to the recovery room.      Ronnie Nguyen MD  2/8/2019

## 2019-02-08 NOTE — THERAPY DISCHARGE NOTE
Acute Care - Physical Therapy Initial Eval/Discharge  [facility]     Patient Name: Eula Navarrete  : 1965  MRN: [id number]  Today's Date: 2019   Onset of Illness/Injury or Date of Surgery: 19  Date of Referral to PT: 19  Referring Physician: Patrick      Admit Date: 2019    Visit Dx:    ICD-10-CM ICD-9-CM   1. Primary osteoarthritis of right knee M17.11 715.16     Patient Active Problem List   Diagnosis   • Primary osteoarthritis of right knee   • OA (osteoarthritis) of knee     Past Medical History:   Diagnosis Date   • Arthritis    • Baker's cyst appears to have one   • Bunion, left foot    • DDD (degenerative disc disease), cervical    • Ganglion cyst    • GERD (gastroesophageal reflux disease)    • Hyperlipidemia    • Knee pain     RIGHT   • PONV (postoperative nausea and vomiting)      Past Surgical History:   Procedure Laterality Date   • HYSTERECTOMY      Partial   • KNEE ARTHROSCOPY  R knee arthoscopy   • KNEE MENISCAL REPAIR     • LASIK Right    • TUBAL ABDOMINAL LIGATION            PT ASSESSMENT (last 12 hours)      Physical Therapy Evaluation     Row Name 19 1644          PT Evaluation Time/Intention    Subjective Information  no complaints  -CA     Document Type  evaluation  -CA     Mode of Treatment  individual therapy;physical therapy  -CA     Patient Effort  good  -CA     Symptoms Noted During/After Treatment  none  -CA     Row Name 19 1647          General Information    Patient Profile Reviewed?  yes  -CA     Onset of Illness/Injury or Date of Surgery  19  -CA     Referring Physician  Patrick  -CA     Patient Observations  alert;cooperative;agree to therapy  -CA     Equipment Currently Used at Home  none  -CA     Pertinent History of Current Functional Problem  R UKA  -CA     Row Name 19 1646          Relationship/Environment    Lives With  spouse  -CA     Row Name 19 1644          Resource/Environmental Concerns    Current Living  Arrangements  home/apartment/condo  -CA     Row Name 02/08/19 1644          Home Main Entrance    Number of Stairs, Main Entrance  two  -CA     Row Name 02/08/19 1644          Cognitive Assessment/Intervention- PT/OT    Orientation Status (Cognition)  oriented x 4  -CA     Follows Commands (Cognition)  WFL  -CA     Row Name 02/08/19 1644          Bed Mobility Assessment/Treatment    Comment (Bed Mobility)  not tested, anticipate ind based on other functional mobility  -CA     Row Name 02/08/19 1644          Transfer Assessment/Treatment    Transfer Assessment/Treatment  sit-stand transfer;stand-sit transfer  -CA     Sit-Stand [place] (Transfers)  supervision  -CA     Stand-Sit [place] (Transfers)  supervision  -CA     Row Name 02/08/19 1644          Sit-Stand Transfer    Assistive Device (Sit-Stand Transfers)  walker, front-wheeled  -CA     Row Name 02/08/19 1644          Stand-Sit Transfer    Assistive Device (Stand-Sit Transfers)  walker, front-wheeled  -CA     Row Name 02/08/19 1644          Gait/Stairs Assessment/Training    [place] Level (Gait)  supervision  -CA     Assistive Device (Gait)  walker, front-wheeled  -CA     Distance in Feet (Gait)  100  -CA     Pattern (Gait)  step-to  -CA     Deviations/Abnormal Patterns (Gait)  antalgic;solomon decreased;gait speed decreased  -CA     Negotiation (Stairs)  stairs independence;stairs assistive device;handrail location;number of steps;ascending technique;descending technique  -CA     [place] Level (Stairs)  contact guard  -CA     Handrail Location (Stairs)  both sides  -CA     Number of Steps (Stairs)  4  -CA     Ascending Technique (Stairs)  step-to-step  -CA     Descending Technique (Stairs)  step-to-step  -CA     Row Name 02/08/19 1644          General ROM    GENERAL ROM COMMENTS  grossly wfl, exception R knee NT  -CA     Row Name 02/08/19 1644          MMT (Manual Muscle Testing)    General MMT Comments  grossly at least 3/5 based on  functional mobility  -CA     Row Name 02/08/19 1644          Motor Assessment/Intervention    Additional Documentation  Therapeutic Exercise Interventions (Group)  -CA     Row Name 02/08/19 1644          Therapeutic Exercise    Comment (Therapeutic Exercise)  verbally reviewed TKA HEP, 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 at this time as she feels comfortable performing them on her own at home  -CA     Row Name 02/08/19 1644          Sensory Assessment/Intervention    Sensory General Assessment  no sensation deficits identified  -CA     Row Name             Wound 02/08/19 0742 Right knee incision    Wound - Properties Group Date first assessed: 02/08/19  -MB Time first assessed: 0742  -MB Side: Right  -MB Location: knee  -MB Type: incision  -MB    Row Name 02/08/19 1644          Physical Therapy Clinical Impression    Date of Referral to PT  02/08/19  -CA     Row Name 02/08/19 1644          Positioning and Restraints    Pre-Treatment Position  sitting in chair/recliner  -CA     Post Treatment Position  chair  -CA     In Chair  reclined;call light within reach;encouraged to call for assist;with family/caregiver  -CA     Row Name 02/08/19 1644          Living Environment    Home Accessibility  stairs to enter home  -CA       User Key  (r) = Recorded By, (t) = Taken By, (c) = Cosigned By    Initials Name Provider Type    Josey Meneses, RN Registered Nurse    Vonda Martinez, MARITA Physical Therapist          Physical Therapy Education     Title: PT OT SLP Therapies (Done)     Topic: Physical Therapy (Done)     Point: Mobility training (Done)     Learning Progress Summary           Patient Acceptance, E, VU by CA at 2/8/2019  4:49 PM                   Point: Home exercise program (Done)     Learning Progress Summary           Patient Acceptance, E, VU by CA at 2/8/2019  4:49 PM                   Point: Body mechanics (Done)     Learning Progress Summary           Patient Acceptance, E,  VU by CA at 2/8/2019  4:49 PM                   Point: Precautions (Done)     Learning Progress Summary           Patient Acceptance, E, VU by CA at 2/8/2019  4:49 PM                               User Key     Initials Effective Dates Name Provider Type Discipline    CA 06/13/18 -  Vonda Calderón, PT Physical Therapist PT                PT Recommendation and Plan  Anticipated Discharge Disposition (PT): home with assist, home with home health  Therapy Frequency (PT Clinical Impression): evaluation only  Outcome Summary/Treatment Plan (PT)  Anticipated Equipment Needs at Discharge (PT): front wheeled walker  Anticipated Discharge Disposition (PT): home with assist, home with home health  Plan of Care Reviewed With: patient  Outcome Summary: Pt is a 53 y.o. female admitted to [facility] for R UKA on 2/8/2019. Pt is SV for STS transfers, and SV for amb with FWW for 100 ft. Pt ascends/descends 4 steps with B HR with CGA in Step to pattern. Pt will need FWW for home. Pt denies any questions or concerns for PT at this time. Pt has no further acute skilled PT needs at this time and is appropriate for DC home with assist and HH PT today.    Outcome Measures     Row Name 02/08/19 1600             How much help from another person do you currently need...    Turning from your back to your side while in flat bed without using bedrails?  4  -CA      Moving from lying on back to sitting on the side of a flat bed without bedrails?  4  -CA      Moving to and from a bed to a chair (including a wheelchair)?  4  -CA      Standing up from a chair using your arms (e.g., wheelchair, bedside chair)?  4  -CA      Climbing 3-5 steps with a railing?  3  -CA      To walk in hospital room?  4  -CA      AM-PAC 6 Clicks Score  23  -CA         Functional Assessment    Outcome Measure Options  AM-PAC 6 Clicks Basic Mobility (PT)  -CA        User Key  (r) = Recorded By, (t) = Taken By, (c) = Cosigned By    Initials Name Provider Type    EDWARDO Calderón  Vonda, PT Physical Therapist           Time Calculation:   PT Charges     Row Name 02/08/19 1651             Time Calculation    Start Time  1605  -CA      Stop Time  1640  -CA      Time Calculation (min)  35 min  -CA      PT Received On  02/08/19  -CA         Time Calculation- PT    Total Timed Code Minutes- PT  23 minute(s)  -CA        User Key  (r) = Recorded By, (t) = Taken By, (c) = Cosigned By    Initials Name Provider Type    CA Vonda Calderón, MARITA Physical Therapist        Therapy Suggested Charges     Code   Minutes Charges    None           Therapy Charges for Today     Code Description Service Date Service Provider Modifiers Qty    46385707566 HC PT EVAL LOW COMPLEXITY 2 2/8/2019 Vonda Calderón, PT GP 1    20741515257 HC PT THERAPEUTIC ACT EA 15 MIN 2/8/2019 Vonda Calderón, PT GP 1    26960044709 HC GAIT TRAINING EA 15 MIN 2/8/2019 Vonda Calderón, PT GP 1          PT G-Codes  Outcome Measure Options: AM-PAC 6 Clicks Basic Mobility (PT)  AM-PAC 6 Clicks Score: 23    PT Discharge Summary  Anticipated Discharge Disposition (PT): home with assist, home with home health    Vonda Calderón, MARITA  2/8/2019

## 2019-02-08 NOTE — PLAN OF CARE
Problem: Patient Care Overview  Goal: Plan of Care Review  Outcome: Outcome(s) achieved Date Met: 02/08/19 02/08/19 1802   Coping/Psychosocial   Plan of Care Reviewed With patient   OTHER   Outcome Summary Patient is ambulating using walker and stand by assist. Vitals are stable and voiding function is intact. Pain is minimal and patient has not required any po meds yet. Patient has had PONV, but it is improving and is reactive to Zofran. Patient is prepared and ready for d/c home with hh.    Plan of Care Review   Progress improving       Problem: Fall Risk (Adult)  Goal: Identify Related Risk Factors and Signs and Symptoms  Outcome: Outcome(s) achieved Date Met: 02/08/19 02/08/19 1802   Fall Risk (Adult)   Related Risk Factors (Fall Risk) environment unfamiliar;slippery/uneven surfaces;polypharmacy;culprit medication(s);gait/mobility problems;fatigue/slow reaction;bladder function altered;objects hard to reach;sensory deficits   Signs and Symptoms (Fall Risk) presence of risk factors     Goal: Absence of Fall  Outcome: Outcome(s) achieved Date Met: 02/08/19 02/08/19 1802   Fall Risk (Adult)   Absence of Fall achieves outcome       Problem: Knee Arthroplasty (Total, Partial) (Adult)  Goal: Signs and Symptoms of Listed Potential Problems Will be Absent, Minimized or Managed (Knee Arthroplasty)  Outcome: Outcome(s) achieved Date Met: 02/08/19 02/08/19 1802   Goal/Outcome Evaluation   Problems Assessed (Knee Arthroplasty) all   Problems Present (Knee Arthroplasty) pain;range of motion decreased     Goal: Anesthesia/Sedation Recovery  Outcome: Outcome(s) achieved Date Met: 02/08/19 02/08/19 1802   Goal/Outcome Evaluation   Anesthesia/Sedation Recovery recovered to baseline

## 2019-02-08 NOTE — ANESTHESIA PREPROCEDURE EVALUATION
Anesthesia Evaluation     Patient summary reviewed and Nursing notes reviewed   history of anesthetic complications: PONV  NPO Solid Status: > 8 hours  NPO Liquid Status: > 2 hours           Airway   Mallampati: II  TM distance: >3 FB  Neck ROM: full  Dental - normal exam     Pulmonary - negative pulmonary ROS and normal exam   Cardiovascular - normal exam  Exercise tolerance: good (4-7 METS)    (+) hyperlipidemia,       Neuro/Psych- negative ROS  GI/Hepatic/Renal/Endo    (+)  GERD,      Musculoskeletal     Abdominal  - normal exam    Bowel sounds: normal.   Substance History - negative use     OB/GYN negative ob/gyn ROS         Other   (+) arthritis                     Anesthesia Plan    ASA 2     spinal     Anesthetic plan, all risks, benefits, and alternatives have been provided, discussed and informed consent has been obtained with: patient.    Plan discussed with CRNA.

## 2019-02-08 NOTE — ANESTHESIA POSTPROCEDURE EVALUATION
Patient: Eula Navarrete    Procedure Summary     Date:  02/08/19 Room / Location:  [facility] OR [address] [place] [address] MAIN OR    Anesthesia Start:  0701 Anesthesia Stop:  0847    Procedure:  KNEE ARTHROPLASTY UNICOMPARTMENTAL (Right Knee) Diagnosis:       Primary osteoarthritis of right knee      (Primary osteoarthritis of right knee [M17.11])    Surgeon:  Ronnie Nguyen MD Provider:  Rosie Sweeney MD    Anesthesia Type:  spinal ASA Status:  2          Anesthesia Type: spinal  Last vitals  BP   113/72 (02/08/19 1000)   Temp   36.8 °C (98.3 °F) (02/08/19 1000)   Pulse   59 (02/08/19 1000)   Resp   20 (02/08/19 1000)     SpO2   98 % (02/08/19 1000)     Post Anesthesia Care and Evaluation    Patient location during evaluation: PACU  Patient participation: complete - patient participated  Level of consciousness: awake and alert  Pain management: adequate  Airway patency: patent  Anesthetic complications: No anesthetic complications    Cardiovascular status: acceptable  Respiratory status: acceptable  Hydration status: acceptable    Comments: ---------------------------               02/08/19                      1000         ---------------------------   BP:          113/72         Pulse:         59           Resp:          20           Temp:   36.8 °C (98.3 °F)   SpO2:          98%         ---------------------------

## 2019-02-08 NOTE — PLAN OF CARE
Problem: Patient Care Overview  Goal: Plan of Care Review   02/08/19 7868   Coping/Psychosocial   Plan of Care Reviewed With patient   OTHER   Outcome Summary Pt is a 53 y.o. female admitted to [facility] for R UKA on 2/8/2019. Pt is SV for STS transfers, and SV for amb with FWW for 100 ft. Pt ascends/descends 4 steps with B HR with CGA in Step to pattern. Pt will need FWW for home. Pt denies any questions or concerns for PT at this time. Pt has no further acute skilled PT needs at this time and is appropriate for DC home with assist and HH PT today.

## 2019-02-08 NOTE — ANESTHESIA PROCEDURE NOTES
Spinal Block      Patient location during procedure: OR  Start Time: 2/8/2019 7:12 AM  Performed By  Anesthesiologist: Rosie Sweeney MD  CRNA: Steffi Davenport CRNA  Preanesthetic Checklist  Completed: patient identified, site marked, surgical consent, pre-op evaluation, timeout performed, IV checked, risks and benefits discussed and monitors and equipment checked  Spinal Block Prep:  Patient Position:sitting  Sterile Tech:cap, gloves, mask and sterile barriers  Prep:Chloraprep  Patient Monitoring:blood pressure monitoring and continuous pulse oximetry  Spinal Block Procedure  Approach:midline  Guidance:landmark technique  Location:L3-L4  Needle Type:Sprotte  Needle Gauge:22 G  Placement of Spinal needle event:cerebrospinal fluid aspirated  Paresthesia: no  Fluid Appearance:clear     Post Assessment  Patient Tolerance:patient tolerated the procedure well with no apparent complications  Complications no

## 2019-02-19 ENCOUNTER — TELEPHONE (OUTPATIENT)
Dept: ORTHOPEDIC SURGERY | Facility: CLINIC | Age: 54
End: 2019-02-19

## 2019-02-19 NOTE — TELEPHONE ENCOUNTER
Regarding: Referral Request  Contact: [phone]  ----- Message from Mychart, Generic sent at 2/18/2019 10:55 AM EST -----  MY CHART MESSAGE:    I need a referral  sent to [facility] [address]. They have left your office a message on 2-13-19 and have not got any response yet and My PT with Homehealth left one as well.  I cannot schedule my Outpatient without it. I planned to start if possible on 2-20-19 if they have an opening. Thanks Eula Navarrete

## 2019-02-19 NOTE — TELEPHONE ENCOUNTER
[facility] called requesting OUT-PATIENT physical therapy order for appt tomorrow. Says they called x 2 last week and spoke with Jyothi. (no message seen) Also, patient sent e-mail yesterday. Please fax order to Juanis Perez PT, Dr. Fax# [phone]    S/P right TKA by RBRAMEZ on 2/8/19  PLEASE SEND TO OTHER MD TO WRITE THIS ORDER FOR TOMORROW-THANKS

## 2019-02-20 DIAGNOSIS — Z96.659 STATUS POST KNEE REPLACEMENT, UNSPECIFIED LATERALITY: Primary | ICD-10-CM

## 2019-02-21 ENCOUNTER — TREATMENT (OUTPATIENT)
Dept: PHYSICAL THERAPY | Facility: CLINIC | Age: 54
End: 2019-02-21

## 2019-02-21 DIAGNOSIS — Z96.651 S/P RIGHT UNICOMPARTMENTAL KNEE REPLACEMENT: Primary | ICD-10-CM

## 2019-02-21 DIAGNOSIS — R26.9 GAIT DISTURBANCE: ICD-10-CM

## 2019-02-21 PROCEDURE — 97110 THERAPEUTIC EXERCISES: CPT | Performed by: PHYSICAL THERAPIST

## 2019-02-21 PROCEDURE — 97140 MANUAL THERAPY 1/> REGIONS: CPT | Performed by: PHYSICAL THERAPIST

## 2019-02-21 PROCEDURE — 97162 PT EVAL MOD COMPLEX 30 MIN: CPT | Performed by: PHYSICAL THERAPIST

## 2019-02-21 PROCEDURE — 97530 THERAPEUTIC ACTIVITIES: CPT | Performed by: PHYSICAL THERAPIST

## 2019-02-21 NOTE — PROGRESS NOTES
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 -  Pain  Current pain rating: 3  At worst pain ratin  Location: Medial knee (R) ; Sciatic nerve (R);  Entire leg aches from thigh to ankle -   Quality: Aches -   Alleviating factors: Change position -   Exacerbated by: Steps; Extension;   Progression: improved    Social Support  Patient lives at: One story with basement stairs -     Treatments  Current treatment: physical therapy  Patient Goals  Patient/family treatment goals: Pain; Normal mobility, strength, gait, function, and RTW w/o restrictions -         ___________________________________________________  Objective       Observations     Additional Observation Details  (R) knee diffusely swollen (mild/mod) with dressings in place with no drainage noted -   Standing with flexed (R) knee w/ WS to (L) -     Palpation     Additional Palpation Details  Tender (R) knee posterior knee, distal HS into insertions (med/lat) - Tender ant/medial knee (R); Tender at proximal Tib-fib region (R)  Mildly antalgic gait w/ mildl flexed (R) knee and decreased WB-ing RLE -       Active Range of Motion     Additional Active Range of Motion Details  (R) Knee: FLEXION 117-deg;  EXT 15-deg (Pain at PF Jt w/ Extension)    Tests     Additional Tests Details  NA -Knee  Increased play at Proximal Tib-Fib Jt (R)     See Treatment Flow sheet  for Exercises, Manual therapy, and modalities.   FUNCTIONAL ACTIVITIES: X 10 min  · TAPING / BRACING: NA  · Jt protection, ADL modification; Posture and ; Edema reduction, etc    ___________________________________________________  Assessment & Plan     Assessment  Assessment details: S/P Unicompartmental Medial Knee Arthroplasty 02/08/2019    PROBLEMS: Pain; Limited mobility; Weakness; Abnormal gait; Intolerance to ADL's, hobbies, and job-related requirements -   PROGNOSIS: Good    GOALS:   SHORT TERM GOALS: 2 weeks:  1) HEP Initiated; 2) Pain decreased 50%:   3) AROM  increased:  4) Improved gait / functional ability grossly;     LONG TERM GOALS: 4 weeks (or at time of DISCHARGE): 1) (I) HEP; 2) AROM WFL and pain free; 3) Strength / gait / mobility to be able to perform all ADL's and job-related activities w/o restrictions;       ___________________________________________________  Manual Therapy:    15     mins  19651;   Therapeutic Exercise:    25     mins  26304;     Neuromuscular James:        mins  40798;   Therapeutic Activity:     10     mins  70033;     Ultrasound:          mins  50057;    Electrical Stimulation:        mins  59085 ( );  Dry Needling          mins self-pay   Gait Training:          mins  70442;  Cold Pack:                    15  mins / NC  EVAL TIME:   25 mins    Timed Treatment:   50   mins                Total Treatment:     100   mins    PT SIGNATURE:   Edward Hernández, PT  DATE TREATMENT INITIATED: 2/21/2019  ___________________________________________________  Initial Certification  Certification Period: 5/22/2019  I certify that the therapy services are furnished while this patient is under my care.  The services outlined above are required by this patient, and will be reviewed every 90 days.     PHYSICIAN: ________________________________  DATE: ______  Ronnie Nguyen MD        Please sign and return via fax to [phone].. Thank you, [facility] Physical  Therapy.  ______________________________________________________________________  [address] [facility]  [place] [address]  Phone: [phone] Fax: [phone]

## 2019-02-26 ENCOUNTER — OFFICE VISIT (OUTPATIENT)
Dept: ORTHOPEDIC SURGERY | Facility: CLINIC | Age: 54
End: 2019-02-26

## 2019-02-26 VITALS — HEIGHT: 68 IN | TEMPERATURE: 98.1 F | WEIGHT: 175 LBS | BODY MASS INDEX: 26.52 KG/M2

## 2019-02-26 DIAGNOSIS — Z96.659 STATUS POST KNEE REPLACEMENT, UNSPECIFIED LATERALITY: Primary | ICD-10-CM

## 2019-02-26 PROCEDURE — 99024 POSTOP FOLLOW-UP VISIT: CPT | Performed by: ORTHOPAEDIC SURGERY

## 2019-02-26 NOTE — PROGRESS NOTES
Eula Navarrete : 1965 MRN: [id number] DATE: 2019    DIAGNOSIS: 2 week follow up right uni knee      SUBJECTIVE:Patient returns today for 2 week follow up of right uni knee replacement. Patient reports doing well with no unusual complaints. Appears to be progressing appropriately.    OBJECTIVE:   Exam:. The incision is healing appropriately. No sign of infection. Range of motion is progressing as expected. The calf is soft and nontender with a negative Homans sign.    ASSESSMENT: 2 week status post right uni knee replacement.    PLAN: 1) Staples removed and steri strips applied   2) Order given for PT   3) Discontinue LESIA hose   4) Continue ice PRN   5) aspirin 325 mg orally every day for 6 weeks   6) Follow up in 6 weeks with repeat Xrays of right knee (3views)    Ronnie Nguyen MD  2019

## 2019-02-27 ENCOUNTER — TREATMENT (OUTPATIENT)
Dept: PHYSICAL THERAPY | Facility: CLINIC | Age: 54
End: 2019-02-27

## 2019-02-27 DIAGNOSIS — Z96.651 S/P RIGHT UNICOMPARTMENTAL KNEE REPLACEMENT: Primary | ICD-10-CM

## 2019-02-27 DIAGNOSIS — M17.11 PRIMARY OSTEOARTHRITIS OF RIGHT KNEE: ICD-10-CM

## 2019-02-27 DIAGNOSIS — R26.9 GAIT DISTURBANCE: ICD-10-CM

## 2019-02-27 PROCEDURE — 97110 THERAPEUTIC EXERCISES: CPT | Performed by: PHYSICAL THERAPIST

## 2019-02-27 PROCEDURE — 97530 THERAPEUTIC ACTIVITIES: CPT | Performed by: PHYSICAL THERAPIST

## 2019-02-27 PROCEDURE — 97140 MANUAL THERAPY 1/> REGIONS: CPT | Performed by: PHYSICAL THERAPIST

## 2019-03-03 NOTE — PROGRESS NOTES
Physical Therapy Daily Progress Note    Patient Name: Eula Navarrete         :  1965  Referring Physician: Ronnie Nguyen MD    Subjective   Eula Navarrete reports: having staples removed this week - feels stiff - Notes anterior knee pain with knee extension and with ambulation - RLE   Notes that manual therapy very helpful -     Objective   Pt ambulating with staples removed and steri-strips in place anterior (R) knee - No drainage noted -   Ambulating with flexed (R) knee -   Very tender w/ multiple trigger points posterior (R) knee (popliteal region > distal HS) and proximal gastroc mm -   Up to 120-deg knee flexion (R);  15-deg knee extension -     See Exercise, Manual, and Modality Logs for complete treatment.     Functional / Therapeutic Activities:  10 min  · TAPING / BRACING: K-Tape to 1)  Unload PF Jt (R); 2) Unload posterior knee (R) -   · Jt protection, ADL modification; Posture and      Assessment/Plan  S/P Unicompartmental Medial Knee Arthroplasty 2019   Doing well with decreasing pain and improving AROM and function - Limited extension >> flexion (R) knee    Progress strengthening /stabilization /functional activity       _________________________________________________  Manual Therapy:    15     mins  22721;  Therapeutic Exercise:    30     mins  58169;     Neuromuscular James:        mins  45546;    Therapeutic Activity:     10     mins  05817;     Gait Training:           mins  88839;     Ultrasound:     10     mins  12535;    Electrical Stimulation:         mins  21660 ( );  Dry Needling          mins self-pay    Timed Treatment:   65   mins                  Total Treatment:     80   mins    Edward Hernández PT  Physical Therapist

## 2019-03-06 ENCOUNTER — TREATMENT (OUTPATIENT)
Dept: PHYSICAL THERAPY | Facility: CLINIC | Age: 54
End: 2019-03-06

## 2019-03-06 DIAGNOSIS — Z96.651 S/P RIGHT UNICOMPARTMENTAL KNEE REPLACEMENT: Primary | ICD-10-CM

## 2019-03-06 DIAGNOSIS — M17.11 PRIMARY OSTEOARTHRITIS OF RIGHT KNEE: ICD-10-CM

## 2019-03-06 DIAGNOSIS — R26.9 GAIT DISTURBANCE: ICD-10-CM

## 2019-03-06 PROCEDURE — 97110 THERAPEUTIC EXERCISES: CPT | Performed by: PHYSICAL THERAPIST

## 2019-03-06 PROCEDURE — 97140 MANUAL THERAPY 1/> REGIONS: CPT | Performed by: PHYSICAL THERAPIST

## 2019-03-06 PROCEDURE — 97530 THERAPEUTIC ACTIVITIES: CPT | Performed by: PHYSICAL THERAPIST

## 2019-03-06 NOTE — PROGRESS NOTES
Physical Therapy Daily Progress Note     Patient Name: Eula Navarrete         :  1965  Referring Physician: Ronnie Nguyen MD     Subjective   Eula Navarrete reports: doing well, but- Notes anterior /medial knee/PF jt  pain with knee extension and with ambulation - RLE   Notes that manual therapy very helpful -   PF pain lateral with SLR / Walking, etc  Then noted medial PF pain as well -     Objective   Pt ambulating with  steri-strips in place anterior (R) knee - No drainage noted -   Ambulating with slightly flexed (R) knee -   Very tender w/ multiple trigger points posterior (R) knee (popliteal region > distal HS) and proximal gastroc mm -   Up to 125-deg knee flexion (R);  10-8-deg knee extension -      See Exercise, Manual, and Modality Logs for complete treatment.     Functional / Therapeutic Activities:  10 min  · TAPING / BRACING: K-Tape to 1)  Unload PF Jt (R) including 2 strips lateral aspect to address lateral glide -   · Jt protection, ADL modification; Posture and       Assessment/Plan  S/P Unicompartmental Medial Knee Arthroplasty 2019   Doing well with decreasing pain and improving AROM and function - Limited extension >> flexion (R) knee     Progress strengthening /stabilization /functional activity     _________________________________________________  Manual Therapy:            15     mins  05327;  Therapeutic Exercise:    30     mins  92908;     Neuromuscular James:        mins  93127;    Therapeutic Activity:      10     mins  40798;     Gait Training:                      mins  86520;     Ultrasound:                    06     mins  71596;    Electrical Stimulation:         mins  66358 ( );  Dry Needling                       mins self-pay     Timed Treatment:   61   mins                  Total Treatment:     70   mins     Edward Hernández PT  Physical Therapist

## 2019-03-13 ENCOUNTER — TRANSCRIBE ORDERS (OUTPATIENT)
Dept: ADMINISTRATIVE | Facility: HOSPITAL | Age: 54
End: 2019-03-13

## 2019-03-13 ENCOUNTER — TREATMENT (OUTPATIENT)
Dept: PHYSICAL THERAPY | Facility: CLINIC | Age: 54
End: 2019-03-13

## 2019-03-13 DIAGNOSIS — R92.8 ABNORMAL MAMMOGRAM: Primary | ICD-10-CM

## 2019-03-13 DIAGNOSIS — R26.9 GAIT DISTURBANCE: ICD-10-CM

## 2019-03-13 DIAGNOSIS — M17.11 PRIMARY OSTEOARTHRITIS OF RIGHT KNEE: ICD-10-CM

## 2019-03-13 DIAGNOSIS — Z96.651 S/P RIGHT UNICOMPARTMENTAL KNEE REPLACEMENT: Primary | ICD-10-CM

## 2019-03-13 PROCEDURE — 97140 MANUAL THERAPY 1/> REGIONS: CPT | Performed by: PHYSICAL THERAPIST

## 2019-03-13 PROCEDURE — 97110 THERAPEUTIC EXERCISES: CPT | Performed by: PHYSICAL THERAPIST

## 2019-03-13 PROCEDURE — 97530 THERAPEUTIC ACTIVITIES: CPT | Performed by: PHYSICAL THERAPIST

## 2019-03-18 NOTE — PROGRESS NOTES
Physical Therapy Daily Progress Note     Patient Name: Eula Navarrete         :  1965  Referring Physician: Ronnie Nguyen MD     Subjective   Eula Navarrete reports: doing well, but- Notes anterior /medial knee/PF jt  pain with knee extension and with ambulation - RLE   Notes that manual therapy very helpful -, but says her distal HS are sore from last session DTM, etc, but less pain with walking and movement    PF pain lateral with SLR / Walking, etc     Objective   Pt ambulating with  steri-strips in place anterior (R) knee - No drainage noted -   Ambulating with slightly flexed (R) knee -   Tender suprapatellar region - (R)   Up to 125-deg knee flexion (R);  10-8-deg knee extension -      See Exercise, Manual, and Modality Logs for complete treatment.     Functional / Therapeutic Activities:  10 min  · TAPING / BRACING: K-Tape to 1)  Unload PF Jt (R) including 2 strips lateral aspect to address lateral glide -   · Jt protection, ADL modification; Posture and       Assessment/Plan  S/P Unicompartmental Medial Knee Arthroplasty 2019   Doing well with decreasing pain and improving AROM and function - Limited extension >> flexion (R) knee   Improved gait with increased knee extension and less PF pain after patellar and suprapatellar mobilizations -     Progress strengthening /stabilization /functional activity     _________________________________________________  Manual Therapy:            15     mins  09210;  Therapeutic Exercise:    25     mins  88791;     Neuromuscular James:  05      mins  23608;    Therapeutic Activity:      10     mins  39845;     Gait Training:                      mins  06232;     Ultrasound:                         mins  23860;    Electrical Stimulation:         mins  47891 ( );  Dry Needling                       mins self-pay     Timed Treatment:   55   mins                  Total Treatment:     75   mins     Edward Hernández PT  Physical Therapist

## 2019-03-19 ENCOUNTER — HOSPITAL ENCOUNTER (OUTPATIENT)
Dept: MAMMOGRAPHY | Facility: HOSPITAL | Age: 54
Discharge: HOME | End: 2019-03-19
Admitting: OBSTETRICS & GYNECOLOGY

## 2019-03-19 ENCOUNTER — APPOINTMENT (OUTPATIENT)
Dept: MAMMOGRAPHY | Facility: HOSPITAL | Age: 54
End: 2019-03-19

## 2019-03-19 ENCOUNTER — HOSPITAL ENCOUNTER (OUTPATIENT)
Dept: ULTRASOUND IMAGING | Facility: HOSPITAL | Age: 54
Discharge: HOME | End: 2019-03-19

## 2019-03-19 DIAGNOSIS — R92.8 ABNORMAL MAMMOGRAM: ICD-10-CM

## 2019-03-19 PROCEDURE — 76642 ULTRASOUND BREAST LIMITED: CPT

## 2019-03-19 PROCEDURE — 77065 DX MAMMO INCL CAD UNI: CPT

## 2019-04-09 ENCOUNTER — OFFICE VISIT (OUTPATIENT)
Dept: ORTHOPEDIC SURGERY | Facility: CLINIC | Age: 54
End: 2019-04-09

## 2019-04-09 VITALS — TEMPERATURE: 97.8 F | BODY MASS INDEX: 27.49 KG/M2 | HEIGHT: 68 IN | WEIGHT: 181.4 LBS

## 2019-04-09 DIAGNOSIS — Z96.651 STATUS POST UNICOMPARTMENTAL KNEE REPLACEMENT, RIGHT: Primary | ICD-10-CM

## 2019-04-09 PROCEDURE — 99024 POSTOP FOLLOW-UP VISIT: CPT | Performed by: NURSE PRACTITIONER

## 2019-04-09 PROCEDURE — 73562 X-RAY EXAM OF KNEE 3: CPT | Performed by: NURSE PRACTITIONER

## 2019-04-09 RX ORDER — MELOXICAM 15 MG/1
15 TABLET ORAL DAILY
Qty: 30 TABLET | Refills: 3 | Status: SHIPPED | OUTPATIENT
Start: 2019-04-09 | End: 2019-10-13 | Stop reason: SDUPTHER

## 2019-04-09 NOTE — PROGRESS NOTES
"Patient: Eula Navarrete  YOB: 1965 53 y.o. female  Medical Record Number: [id number]    Chief Complaints:   Chief Complaint   Patient presents with   • Right Knee - Post-op, Pain       History of Present Illness:Eula Navarrete is a 53 y.o. female who presents for follow-up of right knee unicompartmental replacement.  She is doing great and other than some mild swelling denies any problems    Allergies:   Allergies   Allergen Reactions   • Adhesive Tape Itching and Rash     kinesio tape  Plastic and paper tape \"okay.\"   • Tape Itching and Rash     kinesio tape  Plastic and paper tape \"okay.\"       Medications:   Current Outpatient Medications   Medication Sig Dispense Refill   • estradiol 0.5 MG/0.5GM gel Apply 1 packet onto the skin daily. 30 each 11   • Estradiol 0.75 MG/1.25 GM (0.06%) topical gel Place 1 application on the skin as directed by provider 2 (Two) Times a Week.     • Estradiol 0.75 MG/1.25 GM (0.06%) topical gel Apply 1.25 g onto the skin daily. 50 g 5   • Iron-Vit C-Vit B12-Folic Acid (IRON 100 PLUS PO) Take 1 tablet/day by mouth Daily As Needed.     • meloxicam (MOBIC) 15 MG tablet Take 15 mg by mouth Daily. TO HOLD 1 WEEK BEFORE SURGERY     • MULTIPLE VITAMIN PO Take 1 tablet/day by mouth Daily. TO HOLD 1 WEEK BEFORE SURGERY     • pseudoephedrine (SUDAFED 12 HOUR) 120 MG 12 hr tablet Take 120 mg by mouth 2 (Two) Times a Day As Needed for Congestion.     • meloxicam (MOBIC) 15 MG tablet 1 PO Daily with food. 30 tablet 3   • ondansetron (ZOFRAN) 4 MG tablet Take 1 tablet by mouth Every 6 (Six) Hours As Needed for Nausea or Vomiting for up to 10 doses. 10 tablet 0   • pseudoephedrine-guaifenesin (MUCINEX D)  MG per 12 hr tablet Take 1 tablet by mouth As Needed for Congestion or Allergies.       No current facility-administered medications for this visit.      Facility-Administered Medications Ordered in Other Visits   Medication Dose Route Frequency Provider Last Rate Last Dose " "  • mupirocin (BACTROBAN) 2 % nasal ointment   Nasal BID Ronnie Nguyen MD             The following portions of the patient's history were reviewed and updated as appropriate: allergies, current medications, past family history, past medical history, past social history, past surgical history and problem list.    Review of Systems:   A 14 point review of systems was performed. All systems negative except pertinent positives/negative listed in HPI above    Physical Exam:   Vitals:    04/09/19 1627   Temp: 97.8 °F (36.6 °C)   TempSrc: Temporal   Weight: 82.3 kg (181 lb 6.4 oz)   Height: 172.7 cm (68\")       General: A and O x 3, ASA, NAD    SCLERA:    Normal    DENTITION:   Normal  Right knee the patient has a well-healed midline surgical incision noted excellent range of motion with no instability calf is soft and nontender    Radiology:  Xrays 3views (ap,lateral, sunrise) right knee were ordered and reviewed today secondary to recent surgery show well-placed well-positioned right knee unicompartmental replacement.  Compared to views are unchanged    Assessment/Plan:  Status post right U KA    Patient will continue with regular exercise program will return the office in 10 months for follow-up and repeat x-rays at that time  "

## 2019-05-28 ENCOUNTER — TELEPHONE (OUTPATIENT)
Dept: ORTHOPEDIC SURGERY | Facility: CLINIC | Age: 54
End: 2019-05-28

## 2019-05-28 DIAGNOSIS — Z96.651 STATUS POST UNICOMPARTMENTAL KNEE REPLACEMENT, RIGHT: Primary | ICD-10-CM

## 2019-05-28 RX ORDER — CEPHALEXIN 500 MG/1
CAPSULE ORAL
Qty: 4 CAPSULE | Refills: 5 | Status: SHIPPED | OUTPATIENT
Start: 2019-05-28 | End: 2020-02-11

## 2019-05-28 NOTE — TELEPHONE ENCOUNTER
S/P RIGHT UKA BY TERESA ON 2/8/19    Patient calling for premedication for a dental cleaning appointment today. She uses the [facility] pharmacy.     AMB OUT OF OFFICE TODAY-PLEASE SEND TO MD. NEEDS RX TODAY.

## 2019-10-13 DIAGNOSIS — Z96.651 STATUS POST UNICOMPARTMENTAL KNEE REPLACEMENT, RIGHT: Primary | ICD-10-CM

## 2019-10-15 RX ORDER — MELOXICAM 15 MG/1
15 TABLET ORAL DAILY
Qty: 30 TABLET | Refills: 3 | Status: SHIPPED | OUTPATIENT
Start: 2019-10-15 | End: 2020-03-02 | Stop reason: SDUPTHER

## 2020-02-07 ENCOUNTER — TRANSCRIBE ORDERS (OUTPATIENT)
Dept: ADMINISTRATIVE | Facility: HOSPITAL | Age: 55
End: 2020-02-07

## 2020-02-07 DIAGNOSIS — Z12.39 SCREENING BREAST EXAMINATION: Primary | ICD-10-CM

## 2020-02-11 ENCOUNTER — OFFICE VISIT (OUTPATIENT)
Dept: ORTHOPEDIC SURGERY | Facility: CLINIC | Age: 55
End: 2020-02-11

## 2020-02-11 VITALS — TEMPERATURE: 98.1 F | HEIGHT: 68 IN | BODY MASS INDEX: 25.63 KG/M2 | WEIGHT: 169.1 LBS

## 2020-02-11 DIAGNOSIS — Z96.651 STATUS POST UNICOMPARTMENTAL KNEE REPLACEMENT, RIGHT: Primary | ICD-10-CM

## 2020-02-11 PROCEDURE — 99212 OFFICE O/P EST SF 10 MIN: CPT | Performed by: ORTHOPAEDIC SURGERY

## 2020-02-11 PROCEDURE — 73562 X-RAY EXAM OF KNEE 3: CPT | Performed by: ORTHOPAEDIC SURGERY

## 2020-02-11 NOTE — PROGRESS NOTES
"Eula Navarrete : 1965 MRN: [id number] DATE: 2020    Chief Complaint:  Follow up right uni knee      SUBJECTIVE:Patient returns today for  1 year follow up of right uni knee replacement. Patient reports doing well with no unusual complaints. Denies any limitations due to the knee.    OBJECTIVE:    Temp 98.1 °F (36.7 °C)   Ht 172.7 cm (68\")   Wt 76.7 kg (169 lb 1.6 oz)   BMI 25.71 kg/m²   Family History   Problem Relation Age of Onset   • Cancer Sister    • Malig Hyperthermia Neg Hx      Past Medical History:   Diagnosis Date   • Arthritis    • Baker's cyst appears to have one   • Bunion, left foot    • DDD (degenerative disc disease), cervical    • Ganglion cyst    • GERD (gastroesophageal reflux disease)    • Hyperlipidemia    • Knee pain     RIGHT   • PONV (postoperative nausea and vomiting)      Past Surgical History:   Procedure Laterality Date   • BREAST BIOPSY     • BREAST CYST ASPIRATION     • HYSTERECTOMY      Partial   • KNEE ARTHROPLASTY UNICOMPARTMENTAL Right 2019    Procedure: KNEE ARTHROPLASTY UNICOMPARTMENTAL;  Surgeon: Ronnie Nguyen MD;  Location: [facility];  Service: Orthopedics   • KNEE ARTHROSCOPY  R knee arthoscopy   • KNEE MENISCAL REPAIR     • LASIK Right    • TUBAL ABDOMINAL LIGATION       Social History     Socioeconomic History   • Marital status:      Spouse name: Not on file   • Number of children: Not on file   • Years of education: Not on file   • Highest education level: Not on file   Tobacco Use   • Smoking status: Never Smoker   • Smokeless tobacco: Never Used   Substance and Sexual Activity   • Alcohol use: Yes     Types: 1 - 2 Glasses of wine, 1 - 3 Cans of beer per week     Comment: social drinker   • Drug use: No   • Sexual activity: Yes     Partners: Male     Birth control/protection: Surgical       Review of Systems: 14 point review of systems performed pertinent positives and negatives discussed above, all other systems are negative    Exam:. " The incision is well healed. Range of motion is measured at 0 to 135. The calf is soft and nontender with a negative Homans sign. Alignment is neutral. Good quad strength. There is no evidence of varus/valgus or flexion instability. No effusion. Intact to light touch with palpable distal pulses.     DIAGNOSTIC STUDIES  Xrays: 3 views(AP bilateral knees, lateral right, and sunrise bilateral knees) were ordered and reviewed for evaluation of right uni knee replacement. They demonstrate a well positioned, well aligned uni knee replacement without complicating factors noted. In comparison with previous films there has been no change.    ASSESSMENT:   Annual follow up right uni knee replacement. doing well       PLAN:    Continue activities as tolerated  Follow up CRISTAL Nguyen MD  2/11/2020

## 2020-03-02 DIAGNOSIS — Z96.651 STATUS POST UNICOMPARTMENTAL KNEE REPLACEMENT, RIGHT: ICD-10-CM

## 2020-03-03 RX ORDER — MELOXICAM 15 MG/1
15 TABLET ORAL DAILY
Qty: 30 TABLET | Refills: 3 | Status: SHIPPED | OUTPATIENT
Start: 2020-03-03 | End: 2021-02-17 | Stop reason: SDUPTHER

## 2020-03-27 ENCOUNTER — HOSPITAL ENCOUNTER (OUTPATIENT)
Dept: MAMMOGRAPHY | Facility: HOSPITAL | Age: 55
End: 2020-03-27

## 2020-05-15 ENCOUNTER — HOSPITAL ENCOUNTER (OUTPATIENT)
Dept: MAMMOGRAPHY | Facility: HOSPITAL | Age: 55
Discharge: HOME | End: 2020-05-15
Admitting: OBSTETRICS & GYNECOLOGY

## 2020-05-15 DIAGNOSIS — Z12.39 SCREENING BREAST EXAMINATION: ICD-10-CM

## 2020-05-15 PROCEDURE — 77067 SCR MAMMO BI INCL CAD: CPT

## 2020-05-15 PROCEDURE — 77063 BREAST TOMOSYNTHESIS BI: CPT

## 2021-01-25 ENCOUNTER — TELEPHONE (OUTPATIENT)
Dept: FAMILY MEDICINE CLINIC | Facility: CLINIC | Age: 56
End: 2021-01-25

## 2021-01-25 DIAGNOSIS — R73.9 ELEVATED BLOOD SUGAR: Primary | ICD-10-CM

## 2021-01-25 DIAGNOSIS — E78.5 HYPERLIPIDEMIA, UNSPECIFIED HYPERLIPIDEMIA TYPE: ICD-10-CM

## 2021-01-25 NOTE — TELEPHONE ENCOUNTER
Caller: Eula Navarrete    Relationship: Self    Best call back number: [phone]    What orders are you requesting (i.e. lab or imaging): ORDERS    In what timeframe would the patient need to come in: BEFORE 02/17/2021      Additional notes: PATIENT HAS AN APPOINTMENT SCHEDULED FOR 02/17/2021 BUT IS REQUESTING AN ORDER FOR LABS FOR A ROUTINE BLOOD CHECK PRIOR TO APPOINTMENT TO DISCUSS RESULTS. PATIENT WOULD LIKE A CALLBACK IF ORDERS ARE PLACED OR IF REQUEST IS DENIED

## 2021-02-10 LAB
ALBUMIN SERPL-MCNC: 4.5 G/DL (ref 3.5–5.2)
ALBUMIN/GLOB SERPL: 1.7 G/DL
ALP SERPL-CCNC: 69 U/L (ref 39–117)
ALT SERPL-CCNC: 26 U/L (ref 1–33)
AST SERPL-CCNC: 24 U/L (ref 1–32)
BASOPHILS # BLD AUTO: 0.04 10*3/MM3 (ref 0–0.2)
BASOPHILS NFR BLD AUTO: 0.7 % (ref 0–1.5)
BILIRUB SERPL-MCNC: 0.4 MG/DL (ref 0–1.2)
BUN SERPL-MCNC: 16 MG/DL (ref 6–20)
BUN/CREAT SERPL: 24.6 (ref 7–25)
CALCIUM SERPL-MCNC: 10 MG/DL (ref 8.6–10.5)
CHLORIDE SERPL-SCNC: 106 MMOL/L (ref 98–107)
CHOLEST SERPL-MCNC: 229 MG/DL (ref 0–200)
CO2 SERPL-SCNC: 27.9 MMOL/L (ref 22–29)
CREAT SERPL-MCNC: 0.65 MG/DL (ref 0.57–1)
EOSINOPHIL # BLD AUTO: 0.1 10*3/MM3 (ref 0–0.4)
EOSINOPHIL NFR BLD AUTO: 1.6 % (ref 0.3–6.2)
ERYTHROCYTE [DISTWIDTH] IN BLOOD BY AUTOMATED COUNT: 12.3 % (ref 12.3–15.4)
GLOBULIN SER CALC-MCNC: 2.6 GM/DL
GLUCOSE SERPL-MCNC: 105 MG/DL (ref 65–99)
HBA1C MFR BLD: 5.77 % (ref 4.8–5.6)
HCT VFR BLD AUTO: 43.9 % (ref 34–46.6)
HDLC SERPL-MCNC: 56 MG/DL (ref 40–60)
HGB BLD-MCNC: 15 G/DL (ref 12–15.9)
IMM GRANULOCYTES # BLD AUTO: 0.01 10*3/MM3 (ref 0–0.05)
IMM GRANULOCYTES NFR BLD AUTO: 0.2 % (ref 0–0.5)
LDLC SERPL CALC-MCNC: 157 MG/DL (ref 0–100)
LYMPHOCYTES # BLD AUTO: 1.75 10*3/MM3 (ref 0.7–3.1)
LYMPHOCYTES NFR BLD AUTO: 28.6 % (ref 19.6–45.3)
Lab: NORMAL
MCH RBC QN AUTO: 31.3 PG (ref 26.6–33)
MCHC RBC AUTO-ENTMCNC: 34.2 G/DL (ref 31.5–35.7)
MCV RBC AUTO: 91.5 FL (ref 79–97)
MONOCYTES # BLD AUTO: 0.51 10*3/MM3 (ref 0.1–0.9)
MONOCYTES NFR BLD AUTO: 8.3 % (ref 5–12)
NEUTROPHILS # BLD AUTO: 3.71 10*3/MM3 (ref 1.7–7)
NEUTROPHILS NFR BLD AUTO: 60.6 % (ref 42.7–76)
NRBC BLD AUTO-RTO: 0 /100 WBC (ref 0–0.2)
PLATELET # BLD AUTO: 291 10*3/MM3 (ref 140–450)
POTASSIUM SERPL-SCNC: 4.1 MMOL/L (ref 3.5–5.2)
PROT SERPL-MCNC: 7.1 G/DL (ref 6–8.5)
RBC # BLD AUTO: 4.8 10*6/MM3 (ref 3.77–5.28)
SODIUM SERPL-SCNC: 138 MMOL/L (ref 136–145)
TRIGL SERPL-MCNC: 89 MG/DL (ref 0–150)
TSH SERPL DL<=0.005 MIU/L-ACNC: 1.32 UIU/ML (ref 0.27–4.2)
VLDLC SERPL CALC-MCNC: 16 MG/DL (ref 5–40)
WBC # BLD AUTO: 6.12 10*3/MM3 (ref 3.4–10.8)
WRITTEN AUTHORIZATION: NORMAL

## 2021-02-17 ENCOUNTER — OFFICE VISIT (OUTPATIENT)
Dept: FAMILY MEDICINE CLINIC | Facility: CLINIC | Age: 56
End: 2021-02-17

## 2021-02-17 DIAGNOSIS — R73.9 ELEVATED BLOOD SUGAR: ICD-10-CM

## 2021-02-17 DIAGNOSIS — Z96.651 STATUS POST UNICOMPARTMENTAL KNEE REPLACEMENT, RIGHT: ICD-10-CM

## 2021-02-17 DIAGNOSIS — E78.5 HYPERLIPIDEMIA, UNSPECIFIED HYPERLIPIDEMIA TYPE: Primary | ICD-10-CM

## 2021-02-17 DIAGNOSIS — M17.11 PRIMARY OSTEOARTHRITIS OF RIGHT KNEE: ICD-10-CM

## 2021-02-17 PROCEDURE — 99442 PR PHYS/QHP TELEPHONE EVALUATION 11-20 MIN: CPT | Performed by: PHYSICIAN ASSISTANT

## 2021-02-17 RX ORDER — MELOXICAM 15 MG/1
15 TABLET ORAL DAILY PRN
Qty: 90 TABLET | Refills: 1 | Status: SHIPPED | OUTPATIENT
Start: 2021-02-17 | End: 2022-03-18 | Stop reason: SDUPTHER

## 2021-02-17 NOTE — PATIENT INSTRUCTIONS
Dyslipidemia  Dyslipidemia is an imbalance of waxy, fat-like substances (lipids) in the blood. The body needs lipids in small amounts. Dyslipidemia often involves a high level of cholesterol or triglycerides, which are types of lipids.  Common forms of dyslipidemia include:  · High levels of LDL cholesterol. LDL is the type of cholesterol that causes fatty deposits (plaques) to build up in the blood vessels that carry blood away from your heart (arteries).  · Low levels of HDL cholesterol. HDL cholesterol is the type of cholesterol that protects against heart disease. High levels of HDL remove the LDL buildup from arteries.  · High levels of triglycerides. Triglycerides are a fatty substance in the blood that is linked to a buildup of plaques in the arteries.  What are the causes?  Primary dyslipidemia is caused by changes (mutations) in genes that are passed down through families (inherited). These mutations cause several types of dyslipidemia.  Secondary dyslipidemia is caused by lifestyle choices and diseases that lead to dyslipidemia, such as:  · Eating a diet that is high in animal fat.  · Not getting enough exercise.  · Having diabetes, kidney disease, liver disease, or thyroid disease.  · Drinking large amounts of alcohol.  · Using certain medicines.  What increases the risk?  You are more likely to develop this condition if you are an older man or if you are a woman who has gone through menopause. Other risk factors include:  · Having a family history of dyslipidemia.  · Taking certain medicines, including birth control pills, steroids, some diuretics, and beta-blockers.  · Smoking cigarettes.  · Eating a high-fat diet.  · Having certain medical conditions such as diabetes, polycystic ovary syndrome (PCOS), kidney disease, liver disease, or hypothyroidism.  · Not exercising regularly.  · Being overweight or obese with too much belly fat.  What are the signs or symptoms?  In most cases, dyslipidemia does not  usually cause any symptoms.  In severe cases, very high lipid levels can cause:  · Fatty bumps under the skin (xanthomas).  · White or gray ring around the black center (pupil) of the eye.  Very high triglyceride levels can cause inflammation of the pancreas (pancreatitis).  How is this diagnosed?  Your health care provider may diagnose dyslipidemia based on a routine blood test (fasting blood test). Because most people do not have symptoms of the condition, this blood testing (lipid profile) is done on adults age 20 and older and is repeated every 5 years. This test checks:  · Total cholesterol. This measures the total amount of cholesterol in your blood, including LDL cholesterol, HDL cholesterol, and triglycerides. A healthy number is below 200.  · LDL cholesterol. The target number for LDL cholesterol is different for each person, depending on individual risk factors. Ask your health care provider what your LDL cholesterol should be.  · HDL cholesterol. An HDL level of 60 or higher is best because it helps to protect against heart disease. A number below 40 for men or below 50 for women increases the risk for heart disease.  · Triglycerides. A healthy triglyceride number is below 150.  If your lipid profile is abnormal, your health care provider may do other blood tests.  How is this treated?  Treatment depends on the type of dyslipidemia that you have and your other risk factors for heart disease and stroke. Your health care provider will have a target range for your lipid levels based on this information.  For many people, this condition may be treated by lifestyle changes, such as diet and exercise. Your health care provider may recommend that you:  · Get regular exercise.  · Make changes to your diet.  · Quit smoking if you smoke.  If diet changes and exercise do not help you reach your goals, your health care provider may also prescribe medicine to lower lipids. The most commonly prescribed type of medicine  lowers your LDL cholesterol (statin drug). If you have a high triglyceride level, your provider may prescribe another type of drug (fibrate) or an omega-3 fish oil supplement, or both.  Follow these instructions at home:    Eating and drinking  · Follow instructions from your health care provider or dietitian about eating or drinking restrictions.  · Eat a healthy diet as told by your health care provider. This can help you reach and maintain a healthy weight, lower your LDL cholesterol, and raise your HDL cholesterol. This may include:  ? Limiting your calories, if you are overweight.  ? Eating more fruits, vegetables, whole grains, fish, and lean meats.  ? Limiting saturated fat, trans fat, and cholesterol.  · If you drink alcohol:  ? Limit how much you use.  ? Be aware of how much alcohol is in your drink. In the [place] one drink equals one 12 oz bottle of beer (355 mL), one 5 oz glass of wine (148 mL), or one 1½ oz glass of hard liquor (44 mL).  · Do not drink alcohol if:  ? Your health care provider tells you not to drink.  ? You are pregnant, may be pregnant, or are planning to become pregnant.  Activity  · Get regular exercise. Start an exercise and strength training program as told by your health care provider. Ask your health care provider what activities are safe for you. Your health care provider may recommend:  ? 30 minutes of aerobic activity 4-6 days a week. Brisk walking is an example of aerobic activity.  ? Strength training 2 days a week.  General instructions  · Do not use any products that contain nicotine or tobacco, such as cigarettes, e-cigarettes, and chewing tobacco. If you need help quitting, ask your health care provider.  · Take over-the-counter and prescription medicines only as told by your health care provider. This includes supplements.  · Keep all follow-up visits as told by your health care provider.  Contact a health care provider if:  · You are:  ? Having trouble sticking to your  exercise or diet plan.  ? Struggling to quit smoking or control your use of alcohol.  Summary  · Dyslipidemia often involves a high level of cholesterol or triglycerides, which are types of lipids.  · Treatment depends on the type of dyslipidemia that you have and your other risk factors for heart disease and stroke.  · For many people, treatment starts with lifestyle changes, such as diet and exercise.  · Your health care provider may prescribe medicine to lower lipids.  This information is not intended to replace advice given to you by your health care provider. Make sure you discuss any questions you have with your health care provider.  Document Revised: 08/12/2019 Document Reviewed: 07/19/2019  [employer] Patient Education © 2020 [employer]

## 2021-02-17 NOTE — PROGRESS NOTES
Subjective   Eula Navarrete is a 55 y.o. female.     History of Present Illness    Since the last visit, she has overall felt well.  She has Impaired fasting glucose and will monitor labs to watch for DMII and Hyperlipidemia and working on this with diet and exercise.  she has been compliant with current medications have reviewed them.  The patient denies medication side effects. There were no vitals taken for this visit.  Her mom had prediabetes.  Mobic for bunion pain.    Long term use of NSAIDs can lead to heart disease and strokes, as well as GI bleeding/ulcers, and cause kidney disease. Advise labs to monitor renal functions to be done at least every 6 months.    Results for orders placed or performed in visit on 01/25/21   Comprehensive metabolic panel    Specimen: Blood   Result Value Ref Range    Glucose 105 (H) 65 - 99 mg/dL    BUN 16 6 - 20 mg/dL    Creatinine 0.65 0.57 - 1.00 mg/dL    eGFR Non African Am 94 >60 mL/min/1.73    eGFR African Am 114 >60 mL/min/1.73    BUN/Creatinine Ratio 24.6 7.0 - 25.0    Sodium 138 136 - 145 mmol/L    Potassium 4.1 3.5 - 5.2 mmol/L    Chloride 106 98 - 107 mmol/L    Total CO2 27.9 22.0 - 29.0 mmol/L    Calcium 10.0 8.6 - 10.5 mg/dL    Total Protein 7.1 6.0 - 8.5 g/dL    Albumin 4.50 3.50 - 5.20 g/dL    Globulin 2.6 gm/dL    A/G Ratio 1.7 g/dL    Total Bilirubin 0.4 0.0 - 1.2 mg/dL    Alkaline Phosphatase 69 39 - 117 U/L    AST (SGOT) 24 1 - 32 U/L    ALT (SGPT) 26 1 - 33 U/L   Lipid panel    Specimen: Blood   Result Value Ref Range    Total Cholesterol 229 (H) 0 - 200 mg/dL    Triglycerides 89 0 - 150 mg/dL    HDL Cholesterol 56 40 - 60 mg/dL    VLDL Cholesterol Darian 16 5 - 40 mg/dL    LDL Chol Calc (NIH) 157 (H) 0 - 100 mg/dL   TSH    Specimen: Blood   Result Value Ref Range    TSH 1.320 0.270 - 4.200 uIU/mL   Hemoglobin A1c   Result Value Ref Range    Hemoglobin A1C 5.77 (H) 4.80 - 5.60 %   Please Note   Result Value Ref Range    Please note Comment    Written  Authorization   Result Value Ref Range    Written Authorization Comment    CBC and Differential    Specimen: Blood   Result Value Ref Range    WBC 6.12 3.40 - 10.80 10*3/mm3    RBC 4.80 3.77 - 5.28 10*6/mm3    Hemoglobin 15.0 12.0 - 15.9 g/dL    Hematocrit 43.9 34.0 - 46.6 %    MCV 91.5 79.0 - 97.0 fL    MCH 31.3 26.6 - 33.0 pg    MCHC 34.2 31.5 - 35.7 g/dL    RDW 12.3 12.3 - 15.4 %    Platelets 291 140 - 450 10*3/mm3    Neutrophil Rel % 60.6 42.7 - 76.0 %    Lymphocyte Rel % 28.6 19.6 - 45.3 %    Monocyte Rel % 8.3 5.0 - 12.0 %    Eosinophil Rel % 1.6 0.3 - 6.2 %    Basophil Rel % 0.7 0.0 - 1.5 %    Neutrophils Absolute 3.71 1.70 - 7.00 10*3/mm3    Lymphocytes Absolute 1.75 0.70 - 3.10 10*3/mm3    Monocytes Absolute 0.51 0.10 - 0.90 10*3/mm3    Eosinophils Absolute 0.10 0.00 - 0.40 10*3/mm3    Basophils Absolute 0.04 0.00 - 0.20 10*3/mm3    Immature Granulocyte Rel % 0.2 0.0 - 0.5 %    Immature Grans Absolute 0.01 0.00 - 0.05 10*3/mm3    nRBC 0.0 0.0 - 0.2 /100 WBC     2013 AHA (American Heart Association) Cholesterol Risk Ratio Score Goal is <=7.5% and your score is:  2.04%    Had labs 2-9-21 and added A1C d/t glucose 105; A1C 5.77--watch for DMII  bp [place] 116/80 on 6-23-20  COVID + 1-18-21  Trying walk every day--30 min a day  BMI Readings from Last 1 Encounters:   01/18/21 26.61 kg/m²         The following portions of the patient's history were reviewed and updated as appropriate: allergies, current medications, past family history, past medical history, past social history, past surgical history and problem list.    Review of Systems   Constitutional: Negative for activity change, appetite change and unexpected weight change.   HENT: Negative for nosebleeds and trouble swallowing.    Eyes: Negative for pain and visual disturbance.   Respiratory: Negative for chest tightness, shortness of breath and wheezing.    Cardiovascular: Negative for chest pain and palpitations.   Gastrointestinal: Negative for  abdominal pain and blood in stool.   Endocrine: Negative.    Genitourinary: Negative for difficulty urinating and hematuria.   Musculoskeletal: Positive for arthralgias. Negative for joint swelling.   Skin: Negative for color change and rash.   Allergic/Immunologic: Negative.    Neurological: Negative for syncope and speech difficulty.   Hematological: Negative for adenopathy.   Psychiatric/Behavioral: Negative for agitation and confusion.   All other systems reviewed and are negative.      Objective   Physical Exam  Neurological:      General: No focal deficit present.      Mental Status: She is alert and oriented to person, place, and time.   Psychiatric:         Mood and Affect: Mood normal.         Behavior: Behavior normal.         Thought Content: Thought content normal.         Judgment: Judgment normal.         Assessment/Plan   Diagnoses and all orders for this visit:    1. Hyperlipidemia, unspecified hyperlipidemia type (Primary)    2. Elevated blood sugar    3. Primary osteoarthritis of right knee    4. Status post unicompartmental knee replacement, right  -     meloxicam (MOBIC) 15 MG tablet; Take 1 tablet by mouth Daily. With food PRN pain; stop if GI upset  Dispense: 90 tablet; Refill: 1      Plan, Eula Navarrete, was seen today.  she was seen for Imparied fasting glucose and plan follow up labs, diet, and exercise and Hyperlipidemia and will work on this with diet and exercise.  Stop NSAID if GI upset or any signs tarry or blood in stools  Suggest Tdap  Check on Shingrix  Sees GYN  Time spent 14 minutes--phone

## 2021-03-25 ENCOUNTER — TRANSCRIBE ORDERS (OUTPATIENT)
Dept: ADMINISTRATIVE | Facility: HOSPITAL | Age: 56
End: 2021-03-25

## 2021-03-25 DIAGNOSIS — Z12.39 BREAST SCREENING: Primary | ICD-10-CM

## 2021-03-26 ENCOUNTER — BULK ORDERING (OUTPATIENT)
Dept: CASE MANAGEMENT | Facility: OTHER | Age: 56
End: 2021-03-26

## 2021-03-26 DIAGNOSIS — Z23 IMMUNIZATION DUE: ICD-10-CM

## 2021-07-02 ENCOUNTER — HOSPITAL ENCOUNTER (OUTPATIENT)
Dept: MAMMOGRAPHY | Facility: HOSPITAL | Age: 56
Discharge: HOME | End: 2021-07-02
Admitting: OBSTETRICS & GYNECOLOGY

## 2021-07-02 ENCOUNTER — APPOINTMENT (OUTPATIENT)
Dept: MAMMOGRAPHY | Facility: HOSPITAL | Age: 56
End: 2021-07-02

## 2021-07-02 DIAGNOSIS — Z12.39 BREAST SCREENING: ICD-10-CM

## 2021-07-02 PROCEDURE — 77063 BREAST TOMOSYNTHESIS BI: CPT

## 2021-07-02 PROCEDURE — 77067 SCR MAMMO BI INCL CAD: CPT

## 2021-08-13 ENCOUNTER — IMMUNIZATION (OUTPATIENT)
Dept: VACCINE CLINIC | Facility: HOSPITAL | Age: 56
End: 2021-08-13

## 2021-08-13 PROCEDURE — 0001A: CPT | Performed by: INTERNAL MEDICINE

## 2021-08-13 PROCEDURE — 91300 HC SARSCOV02 VAC 30MCG/0.3ML IM: CPT | Performed by: INTERNAL MEDICINE

## 2021-09-03 ENCOUNTER — APPOINTMENT (OUTPATIENT)
Dept: VACCINE CLINIC | Facility: HOSPITAL | Age: 56
End: 2021-09-03

## 2021-09-04 ENCOUNTER — IMMUNIZATION (OUTPATIENT)
Dept: VACCINE CLINIC | Facility: HOSPITAL | Age: 56
End: 2021-09-04

## 2021-09-04 PROCEDURE — 0002A: CPT | Performed by: INTERNAL MEDICINE

## 2021-09-04 PROCEDURE — 91300 HC SARSCOV02 VAC 30MCG/0.3ML IM: CPT | Performed by: INTERNAL MEDICINE

## 2022-03-09 DIAGNOSIS — R73.9 ELEVATED BLOOD SUGAR: ICD-10-CM

## 2022-03-09 DIAGNOSIS — M17.11 PRIMARY OSTEOARTHRITIS OF RIGHT KNEE: ICD-10-CM

## 2022-03-09 DIAGNOSIS — Z00.00 LABORATORY EXAMINATION ORDERED AS PART OF A ROUTINE GENERAL MEDICAL EXAMINATION: ICD-10-CM

## 2022-03-09 DIAGNOSIS — E78.5 HYPERLIPIDEMIA, UNSPECIFIED HYPERLIPIDEMIA TYPE: Primary | ICD-10-CM

## 2022-03-09 DIAGNOSIS — E55.9 VITAMIN D DEFICIENCY: ICD-10-CM

## 2022-03-16 LAB
25(OH)D3+25(OH)D2 SERPL-MCNC: 27.6 NG/ML (ref 30–100)
ALBUMIN SERPL-MCNC: 4.2 G/DL (ref 3.8–4.9)
ALBUMIN/GLOB SERPL: 1.8 {RATIO} (ref 1.2–2.2)
ALP SERPL-CCNC: 78 IU/L (ref 44–121)
ALT SERPL-CCNC: 16 IU/L (ref 0–32)
AST SERPL-CCNC: 18 IU/L (ref 0–40)
BASOPHILS # BLD AUTO: 0 X10E3/UL (ref 0–0.2)
BASOPHILS NFR BLD AUTO: 1 %
BILIRUB SERPL-MCNC: 0.4 MG/DL (ref 0–1.2)
BUN SERPL-MCNC: 17 MG/DL (ref 6–24)
BUN/CREAT SERPL: 24 (ref 9–23)
CALCIUM SERPL-MCNC: 9.5 MG/DL (ref 8.7–10.2)
CHLORIDE SERPL-SCNC: 106 MMOL/L (ref 96–106)
CHOLEST SERPL-MCNC: 213 MG/DL (ref 100–199)
CO2 SERPL-SCNC: 22 MMOL/L (ref 20–29)
CREAT SERPL-MCNC: 0.7 MG/DL (ref 0.57–1)
EGFRCR SERPLBLD CKD-EPI 2021: 101 ML/MIN/1.73
EOSINOPHIL # BLD AUTO: 0.1 X10E3/UL (ref 0–0.4)
EOSINOPHIL NFR BLD AUTO: 1 %
ERYTHROCYTE [DISTWIDTH] IN BLOOD BY AUTOMATED COUNT: 12.2 % (ref 11.7–15.4)
FOLATE SERPL-MCNC: 16.4 NG/ML
GLOBULIN SER CALC-MCNC: 2.4 G/DL (ref 1.5–4.5)
GLUCOSE SERPL-MCNC: 102 MG/DL (ref 65–99)
HBA1C MFR BLD: 5.5 % (ref 4.8–5.6)
HCT VFR BLD AUTO: 44.4 % (ref 34–46.6)
HDLC SERPL-MCNC: 49 MG/DL
HGB BLD-MCNC: 14.5 G/DL (ref 11.1–15.9)
IMM GRANULOCYTES # BLD AUTO: 0 X10E3/UL (ref 0–0.1)
IMM GRANULOCYTES NFR BLD AUTO: 0 %
LDLC SERPL CALC-MCNC: 150 MG/DL (ref 0–99)
LYMPHOCYTES # BLD AUTO: 2.1 X10E3/UL (ref 0.7–3.1)
LYMPHOCYTES NFR BLD AUTO: 29 %
MCH RBC QN AUTO: 30.1 PG (ref 26.6–33)
MCHC RBC AUTO-ENTMCNC: 32.7 G/DL (ref 31.5–35.7)
MCV RBC AUTO: 92 FL (ref 79–97)
MONOCYTES # BLD AUTO: 0.5 X10E3/UL (ref 0.1–0.9)
MONOCYTES NFR BLD AUTO: 6 %
NEUTROPHILS # BLD AUTO: 4.7 X10E3/UL (ref 1.4–7)
NEUTROPHILS NFR BLD AUTO: 63 %
PLATELET # BLD AUTO: 274 X10E3/UL (ref 150–450)
POTASSIUM SERPL-SCNC: 4 MMOL/L (ref 3.5–5.2)
PROT SERPL-MCNC: 6.6 G/DL (ref 6–8.5)
RBC # BLD AUTO: 4.81 X10E6/UL (ref 3.77–5.28)
SODIUM SERPL-SCNC: 144 MMOL/L (ref 134–144)
T3FREE SERPL-MCNC: 3.4 PG/ML (ref 2–4.4)
T4 FREE SERPL-MCNC: 1.2 NG/DL (ref 0.82–1.77)
TRIGL SERPL-MCNC: 78 MG/DL (ref 0–149)
TSH SERPL DL<=0.005 MIU/L-ACNC: 1.16 UIU/ML (ref 0.45–4.5)
VIT B12 SERPL-MCNC: 682 PG/ML (ref 232–1245)
VLDLC SERPL CALC-MCNC: 14 MG/DL (ref 5–40)
WBC # BLD AUTO: 7.4 X10E3/UL (ref 3.4–10.8)

## 2022-03-18 ENCOUNTER — OFFICE VISIT (OUTPATIENT)
Dept: FAMILY MEDICINE CLINIC | Facility: CLINIC | Age: 57
End: 2022-03-18

## 2022-03-18 VITALS
SYSTOLIC BLOOD PRESSURE: 138 MMHG | HEART RATE: 72 BPM | HEIGHT: 68 IN | TEMPERATURE: 97.5 F | RESPIRATION RATE: 16 BRPM | BODY MASS INDEX: 24.86 KG/M2 | WEIGHT: 164 LBS | DIASTOLIC BLOOD PRESSURE: 80 MMHG | OXYGEN SATURATION: 99 %

## 2022-03-18 DIAGNOSIS — E78.5 HYPERLIPIDEMIA, UNSPECIFIED HYPERLIPIDEMIA TYPE: Primary | ICD-10-CM

## 2022-03-18 DIAGNOSIS — E55.9 VITAMIN D DEFICIENCY: ICD-10-CM

## 2022-03-18 DIAGNOSIS — M21.612 BUNION, LEFT FOOT: ICD-10-CM

## 2022-03-18 DIAGNOSIS — R73.01 IMPAIRED FASTING GLUCOSE: ICD-10-CM

## 2022-03-18 DIAGNOSIS — Z12.11 SCREEN FOR COLON CANCER: ICD-10-CM

## 2022-03-18 DIAGNOSIS — Z96.651 STATUS POST UNICOMPARTMENTAL KNEE REPLACEMENT, RIGHT: ICD-10-CM

## 2022-03-18 PROCEDURE — 99214 OFFICE O/P EST MOD 30 MIN: CPT | Performed by: PHYSICIAN ASSISTANT

## 2022-03-18 RX ORDER — MELOXICAM 15 MG/1
15 TABLET ORAL DAILY PRN
Qty: 90 TABLET | Refills: 1 | Status: SHIPPED | OUTPATIENT
Start: 2022-03-18 | End: 2023-02-19 | Stop reason: SDUPTHER

## 2022-03-18 NOTE — PROGRESS NOTES
"Subjective   Eula Navarrete is a 56 y.o. female.     History of Present Illness    Since the last visit, she has overall felt tired.  She has Borderline Hypertension and will need to monitor bp with close follow up, Impaired fasting glucose and will monitor labs to watch for DMII, Hyperlipidemia and working on this with diet and exercise and Vitamin D deficiency and labs are at goal >30 ng/mL.  she has been compliant with current medications have reviewed them.  The patient denies medication side effects.  Will refill medications. /80   Pulse 72   Temp 97.5 °F (36.4 °C)   Resp 16   Ht 172.7 cm (67.99\")   Wt 74.4 kg (164 lb)   SpO2 99%   BMI 24.94 kg/m²     Results for orders placed or performed in visit on 03/09/22   Comprehensive metabolic panel    Specimen: Blood   Result Value Ref Range    Glucose 102 (H) 65 - 99 mg/dL    BUN 17 6 - 24 mg/dL    Creatinine 0.70 0.57 - 1.00 mg/dL    EGFR Result 101 >59 mL/min/1.73    BUN/Creatinine Ratio 24 (H) 9 - 23    Sodium 144 134 - 144 mmol/L    Potassium 4.0 3.5 - 5.2 mmol/L    Chloride 106 96 - 106 mmol/L    Total CO2 22 20 - 29 mmol/L    Calcium 9.5 8.7 - 10.2 mg/dL    Total Protein 6.6 6.0 - 8.5 g/dL    Albumin 4.2 3.8 - 4.9 g/dL    Globulin 2.4 1.5 - 4.5 g/dL    A/G Ratio 1.8 1.2 - 2.2    Total Bilirubin 0.4 0.0 - 1.2 mg/dL    Alkaline Phosphatase 78 44 - 121 IU/L    AST (SGOT) 18 0 - 40 IU/L    ALT (SGPT) 16 0 - 32 IU/L   Lipid panel    Specimen: Blood   Result Value Ref Range    Total Cholesterol 213 (H) 100 - 199 mg/dL    Triglycerides 78 0 - 149 mg/dL    HDL Cholesterol 49 >39 mg/dL    VLDL Cholesterol Darian 14 5 - 40 mg/dL    LDL Chol Calc (NIH) 150 (H) 0 - 99 mg/dL   TSH    Specimen: Blood   Result Value Ref Range    TSH 1.160 0.450 - 4.500 uIU/mL   Hemoglobin A1c    Specimen: Blood   Result Value Ref Range    Hemoglobin A1C 5.5 4.8 - 5.6 %   T3, Free    Specimen: Blood   Result Value Ref Range    T3, Free 3.4 2.0 - 4.4 pg/mL   T4, Free    Specimen: " Blood   Result Value Ref Range    Free T4 1.20 0.82 - 1.77 ng/dL   Vitamin B12    Specimen: Blood   Result Value Ref Range    Vitamin B-12 682 232 - 1,245 pg/mL   Folate    Specimen: Blood   Result Value Ref Range    Folate 16.4 >3.0 ng/mL   Vitamin D 25 Hydroxy    Specimen: Blood   Result Value Ref Range    25 Hydroxy, Vitamin D 27.6 (L) 30.0 - 100.0 ng/mL   CBC and Differential    Specimen: Blood   Result Value Ref Range    WBC 7.4 3.4 - 10.8 x10E3/uL    RBC 4.81 3.77 - 5.28 x10E6/uL    Hemoglobin 14.5 11.1 - 15.9 g/dL    Hematocrit 44.4 34.0 - 46.6 %    MCV 92 79 - 97 fL    MCH 30.1 26.6 - 33.0 pg    MCHC 32.7 31.5 - 35.7 g/dL    RDW 12.2 11.7 - 15.4 %    Platelets 274 150 - 450 x10E3/uL    Neutrophil Rel % 63 Not Estab. %    Lymphocyte Rel % 29 Not Estab. %    Monocyte Rel % 6 Not Estab. %    Eosinophil Rel % 1 Not Estab. %    Basophil Rel % 1 Not Estab. %    Neutrophils Absolute 4.7 1.4 - 7.0 x10E3/uL    Lymphocytes Absolute 2.1 0.7 - 3.1 x10E3/uL    Monocytes Absolute 0.5 0.1 - 0.9 x10E3/uL    Eosinophils Absolute 0.1 0.0 - 0.4 x10E3/uL    Basophils Absolute 0.0 0.0 - 0.2 x10E3/uL    Immature Granulocyte Rel % 0 Not Estab. %    Immature Grans Absolute 0.0 0.0 - 0.1 x10E3/uL   2013 AHA (American Heart Association) Cholesterol Risk Ratio Score Goal is <=7.5% and your score is:  2.04% and now 3.28%    Last A1C was 5.77%----better this year  bp borderline  Mobic for bunion pain--left foot  Long term use of NSAIDs can lead to heart disease and strokes, as well as GI bleeding/ulcers, and cause kidney disease. Advise labs to monitor renal functions to be done at least every 6 months.    No concern vag atrophy    Weight down 15 lbs  Exercises --treadmill 5 x week  Chronic allergy drg    The following portions of the patient's history were reviewed and updated as appropriate: allergies, current medications, past family history, past medical history, past social history, past surgical history and problem list.    Review  of Systems   Constitutional: Negative for activity change, appetite change and unexpected weight change.   HENT: Negative for nosebleeds and trouble swallowing.    Eyes: Negative for pain and visual disturbance.   Respiratory: Negative for chest tightness, shortness of breath and wheezing.    Cardiovascular: Negative for chest pain and palpitations.   Gastrointestinal: Negative for abdominal pain and blood in stool.   Endocrine: Negative.    Genitourinary: Negative for difficulty urinating and hematuria.   Musculoskeletal: Positive for arthralgias and joint swelling.   Skin: Negative for color change and rash.   Allergic/Immunologic: Negative.    Neurological: Negative for syncope and speech difficulty.   Hematological: Negative for adenopathy.   Psychiatric/Behavioral: Negative for agitation and confusion.   All other systems reviewed and are negative.      Objective   Physical Exam  Vitals and nursing note reviewed.   Constitutional:       General: She is not in acute distress.     Appearance: Normal appearance. She is well-developed. She is not ill-appearing or toxic-appearing.   HENT:      Head: Normocephalic.      Right Ear: External ear normal.      Left Ear: External ear normal.      Nose: Nose normal.      Mouth/Throat:      Pharynx: Oropharynx is clear.   Eyes:      General: No scleral icterus.     Conjunctiva/sclera: Conjunctivae normal.      Pupils: Pupils are equal, round, and reactive to light.   Neck:      Thyroid: No thyromegaly.   Cardiovascular:      Rate and Rhythm: Normal rate and regular rhythm.      Pulses: Normal pulses.      Heart sounds: Normal heart sounds. No murmur heard.  Pulmonary:      Effort: Pulmonary effort is normal. No respiratory distress.      Breath sounds: Normal breath sounds. No rales.   Musculoskeletal:         General: Deformity present. Normal range of motion.      Cervical back: Normal range of motion and neck supple.      Right lower leg: No edema.      Left lower leg:  No edema.      Comments: Left foot MTP great toe   Skin:     General: Skin is warm and dry.      Findings: No rash.   Neurological:      General: No focal deficit present.      Mental Status: She is alert and oriented to person, place, and time. Mental status is at baseline.   Psychiatric:         Mood and Affect: Mood normal.         Behavior: Behavior normal.         Thought Content: Thought content normal.         Judgment: Judgment normal.         Assessment/Plan   Diagnoses and all orders for this visit:    1. Hyperlipidemia, unspecified hyperlipidemia type (Primary)  -     Comprehensive metabolic panel; Future  -     Lipid panel; Future  -     Hemoglobin A1c; Future  -     Vitamin D 25 Hydroxy; Future  -     CBC & Differential; Future  -     Cologuard - Stool, Per Rectum; Future    2. Vitamin D deficiency  -     Comprehensive metabolic panel; Future  -     Lipid panel; Future  -     Hemoglobin A1c; Future  -     Vitamin D 25 Hydroxy; Future  -     CBC & Differential; Future  -     Cologuard - Stool, Per Rectum; Future    3. Impaired fasting glucose  -     Comprehensive metabolic panel; Future  -     Lipid panel; Future  -     Hemoglobin A1c; Future  -     Vitamin D 25 Hydroxy; Future  -     CBC & Differential; Future  -     Cologuard - Stool, Per Rectum; Future    4. Bunion, left foot  -     Comprehensive metabolic panel; Future  -     Lipid panel; Future  -     Hemoglobin A1c; Future  -     Vitamin D 25 Hydroxy; Future  -     CBC & Differential; Future  -     Cologuard - Stool, Per Rectum; Future    5. Screen for colon cancer  -     Comprehensive metabolic panel; Future  -     Lipid panel; Future  -     Hemoglobin A1c; Future  -     Vitamin D 25 Hydroxy; Future  -     CBC & Differential; Future  -     Cologuard - Stool, Per Rectum; Future    6. Status post unicompartmental knee replacement, right  -     meloxicam (MOBIC) 15 MG tablet; Take 1 tablet by mouth Daily with food As Needed for pain. stop if GI upset   Dispense: 90 tablet; Refill: 1    great job on weight loss  A1C better  Long term use of NSAIDs can lead to heart disease and strokes, as well as GI bleeding/ulcers, and cause kidney disease. Advise labs to monitor renal functions to be done at least every 6 months.  Plan, Eula Navarrete, was seen today.  she was seen for Borderline HTN and continue to monitor bp readings, Imparied fasting glucose and plan follow up labs, diet, and exercise, Hyperlipidemia and will work on this with diet and exercise and Vitamin D deficiency and supplemented.  Start Flonase for allergies  Cont Mobic---works for pain--foot  Hx knee replacement      Answers for HPI/ROS submitted by the patient on 3/11/2022  Please describe your symptoms.: Doing Bloodwork to assess A1C,  Glucose and Cholestrol  Have you had these symptoms before?: No  How long have you been having these symptoms?: 1-4 days  Please list any medications you are currently taking for this condition.: none  Please describe any probable cause for these symptoms. : none  What is the primary reason for your visit?: Other

## 2022-05-06 ENCOUNTER — TRANSCRIBE ORDERS (OUTPATIENT)
Dept: ADMINISTRATIVE | Facility: HOSPITAL | Age: 57
End: 2022-05-06

## 2022-05-06 DIAGNOSIS — Z12.31 ENCOUNTER FOR SCREENING MAMMOGRAM FOR MALIGNANT NEOPLASM OF BREAST: Primary | ICD-10-CM

## 2022-07-22 ENCOUNTER — HOSPITAL ENCOUNTER (OUTPATIENT)
Dept: MAMMOGRAPHY | Facility: HOSPITAL | Age: 57
Discharge: HOME | End: 2022-07-22
Admitting: OBSTETRICS & GYNECOLOGY

## 2022-07-22 DIAGNOSIS — Z12.31 ENCOUNTER FOR SCREENING MAMMOGRAM FOR MALIGNANT NEOPLASM OF BREAST: ICD-10-CM

## 2022-07-22 PROCEDURE — 77067 SCR MAMMO BI INCL CAD: CPT

## 2022-07-22 PROCEDURE — 77063 BREAST TOMOSYNTHESIS BI: CPT

## 2022-07-27 ENCOUNTER — TELEPHONE (OUTPATIENT)
Dept: FAMILY MEDICINE CLINIC | Facility: CLINIC | Age: 57
End: 2022-07-27

## 2022-07-27 NOTE — TELEPHONE ENCOUNTER
Cologuard was done in April and was negative and she is good for 3 years before she needs screening again

## 2022-07-27 NOTE — TELEPHONE ENCOUNTER
Caller: [employer]    Relationship: Other    Best call back number: [phone]  REF #[id number]    What was the call regarding: PATIENT HAS ALREADY DONE A COLOGUARD THIS April 2022, THEY WERE NOT GOING TO SEND THE SECOND TEST THAT TRISHA RENE ORDERED. PLEASE ADVISE IF THE ORDER STILL NEEDS TO BE SENT-ALSO PLEASE KNOW THAT THE PATIENT MAYBE HAVE TO PAY FOR THIS OUT OF POCKET.     PLEASE ADVISE.     Do you require a callback: YES

## 2022-12-15 ENCOUNTER — TELEMEDICINE (OUTPATIENT)
Dept: FAMILY MEDICINE CLINIC | Facility: CLINIC | Age: 57
End: 2022-12-15

## 2022-12-15 DIAGNOSIS — U07.1 COVID-19 VIRUS DETECTED: Primary | ICD-10-CM

## 2022-12-15 DIAGNOSIS — R05.8 PRODUCTIVE COUGH: ICD-10-CM

## 2022-12-15 PROCEDURE — 99213 OFFICE O/P EST LOW 20 MIN: CPT

## 2022-12-15 RX ORDER — AZITHROMYCIN 250 MG/1
TABLET, FILM COATED ORAL
Qty: 6 TABLET | Refills: 0 | Status: SHIPPED | OUTPATIENT
Start: 2022-12-15 | End: 2022-12-20

## 2022-12-15 RX ORDER — BENZONATATE 200 MG/1
200 CAPSULE ORAL 3 TIMES DAILY PRN
Qty: 30 CAPSULE | Refills: 1 | Status: SHIPPED | OUTPATIENT
Start: 2022-12-15

## 2022-12-15 NOTE — PROGRESS NOTES
Mode of Visit: Video  You have chosen to receive care through a telehealth visit.  Do you consent to use a video/audio connection for your medical care today? Yes   The visit included audio and video interaction. No technical issues occurred during this visit.     Location of patient: Home  Location of provider: [facility]     Subjective       Chief Complaint   Patient presents with   • Headache     Patient tested positive for Covid on 12/18/2022   • Cough   • Sore Throat   • Fever         HPI:        Eula is a 57 y.o. female who presents to  [facility] today.    Eula Navarrete 57 y.o. female who presents for evaluation of fever, sore throat, cough. Symptoms include sore throat, productive cough, headache and fever.  Onset of symptoms was 4 days ago, gradually improving since that time. Patient denies shortness of breath, wheezing, upper respiratory congestion, hemoptysis, pleuritic chest pain, ear drainage, eye discharge, diarrhea, vomiting, vertigo, sneezing, chills, sweats, sinus pain, epistaxis, joint pain.   Evaluation to date: none Treatment to date:  OTC oral decongestants and Mucinex.      She tested positive for Covid-19 yesterday.  Her temperature has resolved.  She reports she is feeling better as of this morning.    Review of Systems   Constitutional: Positive for fever. Negative for chills and fatigue.   HENT: Positive for sore throat. Negative for congestion, ear pain, sinus pressure and trouble swallowing.    Eyes: Negative for visual disturbance.   Respiratory: Positive for cough. Negative for apnea, chest tightness, shortness of breath and wheezing.    Cardiovascular: Negative for chest pain and palpitations.   Gastrointestinal: Negative for abdominal pain, constipation, diarrhea, nausea and vomiting.   Genitourinary: Negative for dysuria, frequency and urgency.   Musculoskeletal:  Negative for back pain.   Skin: Negative for rash.   Neurological: Negative for dizziness, syncope and light-headedness.   Psychiatric/Behavioral: Negative for behavioral problems and sleep disturbance.            PE:   Objective   There were no vitals filed for this visit.     There is no height or weight on file to calculate BMI.    Physical Exam   Constitutional: She appears well-developed and well-nourished. No distress.   HENT:   Head: Normocephalic.   Neck: Neck normal appearance.  Pulmonary/Chest: Effort normal.  No respiratory distress. She no audible wheeze...  No respiratory distress.  Pulmonary effort is normal.  The patient is talking without difficulty.   Neurological: She is alert.   Psychiatric: She has a normal mood and affect. She mood appears normal.                             A/P:     Assessment & Plan   Diagnoses and all orders for this visit:    1. COVID-19 virus detected (Primary)  -     azithromycin (Zithromax Z-Allen) 250 MG tablet; Take 2 tablets the first day, then 1 tablet daily for 4 days.  Dispense: 6 tablet; Refill: 0  -     benzonatate (TESSALON) 200 MG capsule; Take 1 capsule by mouth 3 (Three) Times a Day As Needed for Cough.  Dispense: 30 capsule; Refill: 1    2. Productive cough  -     azithromycin (Zithromax Z-Allen) 250 MG tablet; Take 2 tablets the first day, then 1 tablet daily for 4 days.  Dispense: 6 tablet; Refill: 0  -     benzonatate (TESSALON) 200 MG capsule; Take 1 capsule by mouth 3 (Three) Times a Day As Needed for Cough.  Dispense: 30 capsule; Refill: 1        I spent 14 minutes caring for Eula on this date of service. This time includes time spent by me in the following activities:preparing for the visit, performing a medically appropriate examination and/or evaluation , counseling and educating the patient/family/caregiver, ordering medications, tests, or procedures and documenting information in the medical record     Follow up:   Return if symptoms worsen or fail  to improve.      -Take medication as prescribed.  -Covid test was positive yesterday.  -Monitor for fever and take Tylenol as needed.  Drink plenty of fluids and get plenty of rest.  -Use cool-mist humidifier as needed.  -I recommend an over-the-counter vitamin regimen to boost immune system of the following: Vitamin D3 5,000 IU daily, vitamin C 500-1,000 mg twice daily, Quercetin 250 mg twice daily, and Zinc 100 mg/day.  Purchase a pulse oximeter at any local pharmacy to monitor oxygen saturations.  Call 911 if you have shortness of breath, sharp pain with breathing, decreased oxygen saturations, sharp pain in your back, or a fever that will not come down by Tylenol.  -Remain in quarantine for a total of 10 days from symptom onset.  -Return to the office is symptoms worsen or do not improve.

## 2023-02-19 DIAGNOSIS — Z96.651 STATUS POST UNICOMPARTMENTAL KNEE REPLACEMENT, RIGHT: ICD-10-CM

## 2023-02-19 RX ORDER — MELOXICAM 15 MG/1
15 TABLET ORAL DAILY PRN
Qty: 30 TABLET | Refills: 0 | Status: SHIPPED | OUTPATIENT
Start: 2023-02-19

## 2023-04-19 ENCOUNTER — TRANSCRIBE ORDERS (OUTPATIENT)
Dept: ADMINISTRATIVE | Facility: HOSPITAL | Age: 58
End: 2023-04-19
Payer: COMMERCIAL

## 2023-04-19 DIAGNOSIS — Z12.31 VISIT FOR SCREENING MAMMOGRAM: Primary | ICD-10-CM

## 2023-07-24 ENCOUNTER — HOSPITAL ENCOUNTER (OUTPATIENT)
Dept: MAMMOGRAPHY | Facility: HOSPITAL | Age: 58
Discharge: HOME | End: 2023-07-24
Admitting: OBSTETRICS & GYNECOLOGY
Payer: COMMERCIAL

## 2023-07-24 DIAGNOSIS — Z12.31 VISIT FOR SCREENING MAMMOGRAM: ICD-10-CM

## 2023-07-24 PROCEDURE — 77067 SCR MAMMO BI INCL CAD: CPT

## 2023-07-24 PROCEDURE — 77063 BREAST TOMOSYNTHESIS BI: CPT

## 2024-02-28 ENCOUNTER — TRANSCRIBE ORDERS (OUTPATIENT)
Dept: ADMINISTRATIVE | Facility: HOSPITAL | Age: 59
End: 2024-02-28
Payer: COMMERCIAL

## 2024-02-28 DIAGNOSIS — Z13.9 SCREENING DUE: Primary | ICD-10-CM

## 2024-07-29 ENCOUNTER — HOSPITAL ENCOUNTER (OUTPATIENT)
Dept: MAMMOGRAPHY | Facility: HOSPITAL | Age: 59
Discharge: HOME | End: 2024-07-29
Admitting: OBSTETRICS & GYNECOLOGY
Payer: COMMERCIAL

## 2024-07-29 DIAGNOSIS — Z13.9 SCREENING DUE: ICD-10-CM

## 2024-07-29 PROCEDURE — 77067 SCR MAMMO BI INCL CAD: CPT

## 2024-07-29 PROCEDURE — 77063 BREAST TOMOSYNTHESIS BI: CPT

## 2024-08-21 DIAGNOSIS — Z96.651 STATUS POST UNICOMPARTMENTAL KNEE REPLACEMENT, RIGHT: ICD-10-CM

## 2024-08-21 RX ORDER — MELOXICAM 15 MG/1
15 TABLET ORAL DAILY PRN
Qty: 30 TABLET | Refills: 0 | OUTPATIENT
Start: 2024-08-21

## 2024-09-08 DIAGNOSIS — Z96.651 STATUS POST UNICOMPARTMENTAL KNEE REPLACEMENT, RIGHT: ICD-10-CM

## 2024-09-09 RX ORDER — MELOXICAM 15 MG/1
15 TABLET ORAL DAILY PRN
Qty: 30 TABLET | Refills: 0 | OUTPATIENT
Start: 2024-09-09

## 2025-01-23 ENCOUNTER — OFFICE VISIT (OUTPATIENT)
Dept: FAMILY MEDICINE CLINIC | Facility: CLINIC | Age: 60
End: 2025-01-23
Payer: COMMERCIAL

## 2025-01-23 VITALS
WEIGHT: 176 LBS | HEIGHT: 68 IN | DIASTOLIC BLOOD PRESSURE: 88 MMHG | HEART RATE: 68 BPM | TEMPERATURE: 97.4 F | SYSTOLIC BLOOD PRESSURE: 126 MMHG | RESPIRATION RATE: 16 BRPM | OXYGEN SATURATION: 97 % | BODY MASS INDEX: 26.67 KG/M2

## 2025-01-23 DIAGNOSIS — Z00.00 LABORATORY EXAMINATION ORDERED AS PART OF A ROUTINE GENERAL MEDICAL EXAMINATION: ICD-10-CM

## 2025-01-23 DIAGNOSIS — Z00.00 ROUTINE GENERAL MEDICAL EXAMINATION AT A HEALTH CARE FACILITY: Primary | ICD-10-CM

## 2025-01-23 DIAGNOSIS — Z96.651 STATUS POST UNICOMPARTMENTAL KNEE REPLACEMENT, RIGHT: ICD-10-CM

## 2025-01-23 DIAGNOSIS — E55.9 VITAMIN D DEFICIENCY: ICD-10-CM

## 2025-01-23 DIAGNOSIS — R73.01 IMPAIRED FASTING GLUCOSE: ICD-10-CM

## 2025-01-23 DIAGNOSIS — M21.619 BUNION OF GREAT TOE: ICD-10-CM

## 2025-01-23 PROCEDURE — 93000 ELECTROCARDIOGRAM COMPLETE: CPT | Performed by: PHYSICIAN ASSISTANT

## 2025-01-23 PROCEDURE — 90715 TDAP VACCINE 7 YRS/> IM: CPT | Performed by: PHYSICIAN ASSISTANT

## 2025-01-23 PROCEDURE — 90471 IMMUNIZATION ADMIN: CPT | Performed by: PHYSICIAN ASSISTANT

## 2025-01-23 PROCEDURE — 99396 PREV VISIT EST AGE 40-64: CPT | Performed by: PHYSICIAN ASSISTANT

## 2025-01-23 RX ORDER — MELOXICAM 15 MG/1
15 TABLET ORAL DAILY PRN
Qty: 90 TABLET | Refills: 1 | Status: SHIPPED | OUTPATIENT
Start: 2025-01-23

## 2025-01-23 NOTE — PROGRESS NOTES
Procedure     ECG 12 Lead    Date/Time: 1/23/2025 9:44 AM  Performed by: Breana Barcenas PA-C    Authorized by: Breana Barcenas PA-C  Comparison: compared with previous ECG from 10/25/2016  Similar to previous ECG  Rhythm: sinus rhythm  Rate: normal  BPM: 63  Conduction: conduction normal  ST Segments: ST segments normal  T Waves: T waves normal  QRS axis: left  Other: no other findings    Clinical impression: normal ECG  Comments: EKG Interpretation Report    Heart rate:    63 beats/min, [place] interval:  146 msec, QRS duration:  78 msec  QTu:422 msec, QTc:  432 msec

## 2025-01-23 NOTE — PROGRESS NOTES
"Subjective   Eula Navarrete is a 59 y.o. female.     Eula Navarrete 59 y.o. female who presents for an Annual Wellness Visit.  she has a history of   Patient Active Problem List   Diagnosis    Primary osteoarthritis of right knee    OA (osteoarthritis) of knee    Cervical spondylosis without myelopathy    Left cervical radiculopathy    Neck pain    Bunion, left foot   .  she has been feeling well.   I  reviewed health maintenance with her as part of my preventative care plan.  Saw Dr Mayi Nguyen 7-29-24 for well woman exam and has her on HRT--- does mammo DEXA  Was seen in urgent care 12/24/2023 for conjunctivitis treated and resolved.  Had telehealth visit for COVID 12/15/2022 and resolved.  Had the benign positional vertigo 9/27/2022 and resolved  Walking daily  Up to date on dental and eye  Has dry eye---on gtts       Since the last visit, she has overall felt well.  She has Impaired fasting glucose and will monitor labs to watch for DMII, Hyperlipidemia and working on this with diet and exercise, and Vitamin D deficiency and will update labs for continued management. . /88   Pulse 68   Temp 97.4 °F (36.3 °C) (Temporal)   Resp 16   Ht 172.7 cm (67.99\")   Wt 79.8 kg (176 lb)   SpO2 97%   BMI 26.77 kg/m² .  BMI is >= 25 and <30. (Overweight) The following options were offered after discussion;: weight loss educational material (shared in after visit summary), exercise counseling/recommendations, and nutrition counseling/recommendations    BP at her GYN visit was normal at 124/80 on 8/14/2024  Results for orders placed or performed in visit on 06/05/24   QuantiFERON-TB Gold Plus    Collection Time: 06/05/24  1:54 PM    Specimen: Blood   Result Value Ref Range    QuantiFERON Incubation Incubation performed.     QUANTIFERON-TB GOLD PLUS Negative Negative   QuantiFERON-TB Gold Plus    Collection Time: 06/05/24  1:54 PM    Specimen: Blood   Result Value Ref Range    QuantiFERON Criteria Comment     " QUANTIFERON TB1 AG VALUE 0.03 IU/mL    QUANTIFERON TB2 AG VALUE 0.03 IU/mL    QuantiFERON Nil Value 0.01 IU/mL    QuantiFERON Mitogen Value >10.00 IU/mL     Pain right shoulder   Bunions---will hurt----Long term use of NSAIDs can lead to heart disease and strokes, as well as GI bleeding/ulcers, and cause kidney disease. Advise labs to monitor renal functions to be done at least every 6 months.  Takes PRN only  Wants to wait on seeing ortho.    History of Present Illness     Discoloration left ankle----no change    The following portions of the patient's history were reviewed and updated as appropriate: allergies, current medications, past family history, past medical history, past social history, past surgical history, and problem list.    Review of Systems   Constitutional:  Negative for diaphoresis.   HENT:  Negative for nosebleeds and trouble swallowing.    Eyes:  Negative for blurred vision and visual disturbance.   Respiratory:  Negative for choking.    Gastrointestinal:  Negative for blood in stool.   Allergic/Immunologic: Negative for immunocompromised state.   Neurological:  Negative for facial asymmetry and speech difficulty.   Psychiatric/Behavioral:  Negative for self-injury and suicidal ideas.        Objective   Physical Exam  Vitals and nursing note reviewed.   Constitutional:       General: She is not in acute distress.     Appearance: Normal appearance. She is well-developed. She is not ill-appearing or toxic-appearing.   HENT:      Head: Normocephalic.      Right Ear: External ear normal.      Left Ear: External ear normal.      Nose: Nose normal. No congestion or rhinorrhea.      Mouth/Throat:      Pharynx: Oropharynx is clear.   Eyes:      General: No scleral icterus.     Conjunctiva/sclera: Conjunctivae normal.      Pupils: Pupils are equal, round, and reactive to light.   Neck:      Thyroid: No thyromegaly.      Vascular: No carotid bruit.   Cardiovascular:      Rate and Rhythm: Normal rate and  regular rhythm.      Pulses: Normal pulses.      Heart sounds: Normal heart sounds. No murmur heard.  Pulmonary:      Effort: Pulmonary effort is normal. No respiratory distress.      Breath sounds: Normal breath sounds. No rales.   Abdominal:      General: Abdomen is flat. Bowel sounds are normal.      Palpations: Abdomen is soft.      Tenderness: There is no abdominal tenderness.   Musculoskeletal:         General: No deformity. Normal range of motion.      Cervical back: Normal range of motion and neck supple.      Right lower leg: No edema.      Left lower leg: No edema.      Comments: Slight edema right knee   Lymphadenopathy:      Cervical: No cervical adenopathy.   Skin:     General: Skin is warm and dry.      Findings: No rash.   Neurological:      General: No focal deficit present.      Mental Status: She is alert and oriented to person, place, and time. Mental status is at baseline.      Cranial Nerves: No cranial nerve deficit.      Sensory: No sensory deficit.      Motor: No weakness.      Coordination: Coordination normal.      Gait: Gait normal.      Deep Tendon Reflexes: Reflexes normal.   Psychiatric:         Mood and Affect: Mood normal.         Behavior: Behavior normal.         Thought Content: Thought content normal.         Judgment: Judgment normal.         Physical Exam         Results         Assessment & Plan   Diagnoses and all orders for this visit:    1. Routine general medical examination at a health care facility (Primary)  -     ECG 12 Lead    2. Vitamin D deficiency  -     Comprehensive metabolic panel  -     Lipid panel  -     CBC and Differential  -     TSH  -     T3, Free  -     T4, Free  -     Vitamin D,25-Hydroxy  -     Vitamin B12  -     Folate  -     Urinalysis With Microscopic - Urine, Clean Catch  -     Hemoglobin A1c    3. Bunion of great toe    4. Impaired fasting glucose  -     Comprehensive metabolic panel  -     Lipid panel  -     CBC and Differential  -     TSH  -      T3, Free  -     T4, Free  -     Vitamin D,25-Hydroxy  -     Vitamin B12  -     Folate  -     Urinalysis With Microscopic - Urine, Clean Catch  -     Hemoglobin A1c    5. Laboratory examination ordered as part of a routine general medical examination  -     Comprehensive metabolic panel  -     Lipid panel  -     CBC and Differential  -     TSH  -     T3, Free  -     T4, Free  -     Vitamin D,25-Hydroxy  -     Vitamin B12  -     Folate  -     Urinalysis With Microscopic - Urine, Clean Catch  -     Hemoglobin A1c        Assessment & Plan  Can keep Mobic PRN---works for right shoulder pain, bunion pain PRN  GYN does mammo and will order Cologuard  Update labs  Plan, Eula Navarrete, was seen today.  she was seen for Borderline HTN and continue to monitor bp readings and see me for follow up, Imparied fasting glucose and plan follow up labs, diet, and exercise, Hyperlipidemia and will continue current medication, and Vitamin D deficiency and will update labs .  Low glycemic index diet  Exercise 30 minutes most days of the week  Make sure you get results on any labs or tests we ordered today  We discussed medications and how to take them as prescribed  Sleep 6-8 hours each night if possible  If you have not signed up for [employer], please activate your code ASAP from your After Visit Summary today    LDL goal <100  LDL goal if heart disease <70  HDL goal >60  Triglyceride goal <150  BP goal =<130/80  Fasting glucose <100    Tdap and consider Shingrix

## 2025-01-23 NOTE — LETTER
[facility]  Vaccine Consent Form    Patient Name:  Eula Navarrete  Patient :  1965     Vaccine(s) Ordered    Tdap Vaccine Greater Than or Equal To 6yo IM        Screening Checklist  The following questions should be completed prior to vaccination. If you answer “yes” to any question, it does not necessarily mean you should not be vaccinated. It just means we may need to clarify or ask more questions. If a question is unclear, please ask your healthcare provider to explain it.    Yes No   Any fever or moderate to severe illness today (mild illness and/or antibiotic treatment are not contraindications)?     Do you have a history of a serious reaction to any previous vaccinations, such as anaphylaxis, encephalopathy within 7 days, Guillain-Norwalk syndrome within 6 weeks, seizure?     Have you received any live vaccine(s) (e.g MMR, REBECCA) or any other vaccines in the last month (to ensure duplicate doses aren't given)?     Do you have an anaphylactic allergy to latex (DTaP, DTaP-IPV, Hep A, Hep B, MenB, RV, Td, Tdap), baker’s yeast (Hep B, HPV), polysorbates (RSV, nirsevimab, PCV 20, Rotavirrus, Tdap, Shingrix), or gelatin (REBECCA, MMR)?     Do you have an anaphylactic allergy to neomycin (Rabies, REBECCA, MMR, IPV, Hep A), polymyxin B (IPV), or streptomycin (IPV)?      Any cancer, leukemia, AIDS, or other immune system disorder? (REBECCA, MMR, RV)     Do you have a parent, brother, or sister with an immune system problem (if immune competence of vaccine recipient clinically verified, can proceed)? (MMR, REBECCA)     Any recent steroid treatments for >2 weeks, chemotherapy, or radiation treatment? (REBECCA, MMR)     Have you received antibody-containing blood transfusions or IVIG in the past 11 months (recommended interval is dependent on product)? (MMR, REBECCA)     Have you taken antiviral drugs (acyclovir, famciclovir, valacyclovir for REBECCA) in the last 24 or 48 hours, respectively?      Are you pregnant or planning to become  "pregnant within 1 month? (REBECCA, MMR, HPV, IPV, MenB, Abrexvy; For Hep B- refer to Engerix-B; For RSV - Abrysvo is indicated for 32-36 weeks of pregnancy from September to January)     For infants, have you ever been told your child has had intussusception or a medical emergency involving obstruction of the intestine (Rotavirus)? If not for an infant, can skip this question.         *Ordering Physicians/APC should be consulted if \"yes\" is checked by the patient or guardian above.  I have received, read, and understand the Vaccine Information Statement (VIS) for each vaccine ordered.  I have considered my or my child's health status as well as the health status of my close contacts.  I have taken the opportunity to discuss my vaccine questions with my or my child's health care provider.   I have requested that the ordered vaccine(s) be given to me or my child.  I understand the benefits and risks of the vaccines.  I understand that I should remain in the clinic for 15 minutes after receiving the vaccine(s).  _________________________________________________________  Signature of Patient or Parent/Legal Guardian ____________________  Date   "

## 2025-01-24 ENCOUNTER — TELEPHONE (OUTPATIENT)
Dept: FAMILY MEDICINE CLINIC | Facility: CLINIC | Age: 60
End: 2025-01-24
Payer: COMMERCIAL

## 2025-01-24 LAB
25(OH)D3+25(OH)D2 SERPL-MCNC: 39.8 NG/ML (ref 30–100)
ALBUMIN SERPL-MCNC: 4.3 G/DL (ref 3.5–5.2)
ALBUMIN/GLOB SERPL: 1.8 G/DL
ALP SERPL-CCNC: 72 U/L (ref 39–117)
ALT SERPL-CCNC: 21 U/L (ref 1–33)
APPEARANCE UR: ABNORMAL
AST SERPL-CCNC: 18 U/L (ref 1–32)
BACTERIA #/AREA URNS HPF: ABNORMAL /HPF
BASOPHILS # BLD AUTO: 0.04 10*3/MM3 (ref 0–0.2)
BASOPHILS NFR BLD AUTO: 0.7 % (ref 0–1.5)
BILIRUB SERPL-MCNC: 0.5 MG/DL (ref 0–1.2)
BILIRUB UR QL STRIP: NEGATIVE
BUN SERPL-MCNC: 11 MG/DL (ref 8–23)
BUN/CREAT SERPL: 15.5 (ref 7–25)
CALCIUM SERPL-MCNC: 9.7 MG/DL (ref 8.6–10.5)
CASTS URNS MICRO: ABNORMAL
CHLORIDE SERPL-SCNC: 104 MMOL/L (ref 98–107)
CHOLEST SERPL-MCNC: 232 MG/DL (ref 0–200)
CO2 SERPL-SCNC: 24.9 MMOL/L (ref 22–29)
COLOR UR: YELLOW
CREAT SERPL-MCNC: 0.71 MG/DL (ref 0.57–1)
EGFRCR SERPLBLD CKD-EPI 2021: 97.5 ML/MIN/1.73
EOSINOPHIL # BLD AUTO: 0.05 10*3/MM3 (ref 0–0.4)
EOSINOPHIL NFR BLD AUTO: 0.8 % (ref 0.3–6.2)
EPI CELLS #/AREA URNS HPF: ABNORMAL /HPF
ERYTHROCYTE [DISTWIDTH] IN BLOOD BY AUTOMATED COUNT: 12.1 % (ref 12.3–15.4)
FOLATE SERPL-MCNC: 18.4 NG/ML (ref 4.78–24.2)
GLOBULIN SER CALC-MCNC: 2.4 GM/DL
GLUCOSE SERPL-MCNC: 105 MG/DL (ref 65–99)
GLUCOSE UR QL STRIP: NEGATIVE
HBA1C MFR BLD: 5.4 % (ref 4.8–5.6)
HCT VFR BLD AUTO: 45.2 % (ref 34–46.6)
HDLC SERPL-MCNC: 59 MG/DL (ref 40–60)
HGB BLD-MCNC: 14.9 G/DL (ref 12–15.9)
HGB UR QL STRIP: NEGATIVE
IMM GRANULOCYTES # BLD AUTO: 0.02 10*3/MM3 (ref 0–0.05)
IMM GRANULOCYTES NFR BLD AUTO: 0.3 % (ref 0–0.5)
KETONES UR QL STRIP: NEGATIVE
LDLC SERPL CALC-MCNC: 164 MG/DL (ref 0–100)
LEUKOCYTE ESTERASE UR QL STRIP: NEGATIVE
LYMPHOCYTES # BLD AUTO: 2.1 10*3/MM3 (ref 0.7–3.1)
LYMPHOCYTES NFR BLD AUTO: 34.8 % (ref 19.6–45.3)
MCH RBC QN AUTO: 30.7 PG (ref 26.6–33)
MCHC RBC AUTO-ENTMCNC: 33 G/DL (ref 31.5–35.7)
MCV RBC AUTO: 93 FL (ref 79–97)
MONOCYTES # BLD AUTO: 0.38 10*3/MM3 (ref 0.1–0.9)
MONOCYTES NFR BLD AUTO: 6.3 % (ref 5–12)
NEUTROPHILS # BLD AUTO: 3.45 10*3/MM3 (ref 1.7–7)
NEUTROPHILS NFR BLD AUTO: 57.1 % (ref 42.7–76)
NITRITE UR QL STRIP: NEGATIVE
NRBC BLD AUTO-RTO: 0 /100 WBC (ref 0–0.2)
PH UR STRIP: 6 [PH] (ref 5–8)
PLATELET # BLD AUTO: 287 10*3/MM3 (ref 140–450)
POTASSIUM SERPL-SCNC: 4.7 MMOL/L (ref 3.5–5.2)
PROT SERPL-MCNC: 6.7 G/DL (ref 6–8.5)
PROT UR QL STRIP: NEGATIVE
RBC # BLD AUTO: 4.86 10*6/MM3 (ref 3.77–5.28)
RBC #/AREA URNS HPF: ABNORMAL /HPF
SODIUM SERPL-SCNC: 141 MMOL/L (ref 136–145)
SP GR UR STRIP: 1.01 (ref 1–1.03)
T3FREE SERPL-MCNC: 3.2 PG/ML (ref 2–4.4)
T4 FREE SERPL-MCNC: 1.11 NG/DL (ref 0.92–1.68)
TRIGL SERPL-MCNC: 54 MG/DL (ref 0–150)
TSH SERPL DL<=0.005 MIU/L-ACNC: 1.17 UIU/ML (ref 0.27–4.2)
UROBILINOGEN UR STRIP-MCNC: ABNORMAL MG/DL
VIT B12 SERPL-MCNC: 427 PG/ML (ref 211–946)
VLDLC SERPL CALC-MCNC: 9 MG/DL (ref 5–40)
WBC # BLD AUTO: 6.04 10*3/MM3 (ref 3.4–10.8)
WBC #/AREA URNS HPF: ABNORMAL /HPF

## 2025-01-24 NOTE — TELEPHONE ENCOUNTER
[facility] INSTRUCTING PT TO RETURN CALL TO BE READ LAB RESULTS. LETTER SENT VIA [employer]

## 2025-01-24 NOTE — TELEPHONE ENCOUNTER
----- Message from Breana Barcenas sent at 1/24/2025  9:55 AM EST -----  Mild elevation of cholesterol and your cholesterol rescore is less than 7.5%, it is 3.4%.  Do work on diet exercise and weight loss.  Advise repeating lipids in 1 year.  Glucose is borderline elevated but your hemoglobin A1c average glucose 2 to 3 months is in normal range.  Will continue to monitor.  Kidney and liver functions in normal range.  Blood count normal.  Urine negative.  Vitamin levels in acceptable range.  Other labs okay      The 10-year ASCVD risk score (Rosalino AQUINO, et al., 2019) is: 3.4%    Values used to calculate the score:      Age: 59 years      Sex: Female      Is Non- : No      Diabetic: No      Tobacco smoker: No      Systolic Blood Pressure: 126 mmHg      Is BP treated: No      HDL Cholesterol: 59 mg/dL      Total Cholesterol: 232 mg/dL

## 2025-07-17 ENCOUNTER — TRANSCRIBE ORDERS (OUTPATIENT)
Dept: ADMINISTRATIVE | Facility: HOSPITAL | Age: 60
End: 2025-07-17
Payer: COMMERCIAL

## 2025-07-17 DIAGNOSIS — Z12.31 VISIT FOR SCREENING MAMMOGRAM: Primary | ICD-10-CM

## 2025-08-01 ENCOUNTER — HOSPITAL ENCOUNTER (OUTPATIENT)
Dept: MAMMOGRAPHY | Facility: HOSPITAL | Age: 60
Discharge: HOME | End: 2025-08-01
Admitting: OBSTETRICS & GYNECOLOGY
Payer: COMMERCIAL

## 2025-08-01 ENCOUNTER — TRANSCRIBE ORDERS (OUTPATIENT)
Dept: ADMINISTRATIVE | Facility: HOSPITAL | Age: 60
End: 2025-08-01
Payer: COMMERCIAL

## 2025-08-01 DIAGNOSIS — R92.8 ABNORMAL MAMMOGRAM: Primary | ICD-10-CM

## 2025-08-01 DIAGNOSIS — Z12.31 VISIT FOR SCREENING MAMMOGRAM: ICD-10-CM

## 2025-08-01 PROCEDURE — 77063 BREAST TOMOSYNTHESIS BI: CPT

## 2025-08-01 PROCEDURE — 77067 SCR MAMMO BI INCL CAD: CPT

## (undated) DEVICE — SUT VIC 1 CT1 36IN J947H

## (undated) DEVICE — GLV SURG SENSICARE MICRO PF LF 7 STRL

## (undated) DEVICE — STPLR SKIN VISISTAT WD 35CT

## (undated) DEVICE — DRSNG BURN ACTICOAT FLEX 7 1X24IN

## (undated) DEVICE — UNDERCAST PADDING: Brand: DEROYAL

## (undated) DEVICE — MAT FLR ABSORBENT LG 4FT 10 2.5FT

## (undated) DEVICE — RECIPROCATING BLADE, DOUBLE SIDED, OFFSET  (70.0 X 0.64 X 12.6MM)

## (undated) DEVICE — BNDG ELAS ELITE V/CLOSE 6IN 5YD LF STRL

## (undated) DEVICE — SUT VIC 0 CT1 36IN J946H

## (undated) DEVICE — PICO 7 10CM X 30CM: Brand: PICO™ 7

## (undated) DEVICE — MEDI-VAC YANKAUER SUCTION HANDLE W/BULBOUS TIP: Brand: CARDINAL HEALTH

## (undated) DEVICE — 2108 SERIES SAGITTAL BLADE, NO OFFSET  (12.4 X 1.19 X 82.1MM)

## (undated) DEVICE — GLV SURG SENSICARE PI PF LF 7 GRN STRL

## (undated) DEVICE — PK KN TOTL 40

## (undated) DEVICE — GLV SURG SENSICARE W/ALOE PF LF 7.5 STRL

## (undated) DEVICE — NDL SPINE 22G 31/2IN BLK

## (undated) DEVICE — ENCORE® LATEX ORTHO SIZE 7.5, STERILE LATEX POWDER-FREE SURGICAL GLOVE: Brand: ENCORE

## (undated) DEVICE — 3M™ IOBAN™ 2 ANTIMICROBIAL INCISE DRAPE 6640EZ: Brand: IOBAN™ 2

## (undated) DEVICE — GLV SURG SENSICARE W/ALOE PF LF 8 STRL

## (undated) DEVICE — PREMIUM WET SKIN PREP TRAY: Brand: MEDLINE INDUSTRIES, INC.

## (undated) DEVICE — RECIPROCATING BLADE HEAVY DUTY LONG, OFFSET  (77.6 X 0.77 X 11.2MM)

## (undated) DEVICE — APPL DURAPREP IODOPHOR APL 26ML